# Patient Record
Sex: FEMALE | Race: WHITE
[De-identification: names, ages, dates, MRNs, and addresses within clinical notes are randomized per-mention and may not be internally consistent; named-entity substitution may affect disease eponyms.]

---

## 2017-06-21 ENCOUNTER — TRANSCRIPTION ENCOUNTER (OUTPATIENT)
Age: 52
End: 2017-06-21

## 2017-09-07 ENCOUNTER — APPOINTMENT (OUTPATIENT)
Dept: PULMONOLOGY | Facility: CLINIC | Age: 52
End: 2017-09-07

## 2017-10-17 ENCOUNTER — TRANSCRIPTION ENCOUNTER (OUTPATIENT)
Age: 52
End: 2017-10-17

## 2018-04-06 ENCOUNTER — TRANSCRIPTION ENCOUNTER (OUTPATIENT)
Age: 53
End: 2018-04-06

## 2019-06-15 ENCOUNTER — RECORD ABSTRACTING (OUTPATIENT)
Age: 54
End: 2019-06-15

## 2019-06-15 DIAGNOSIS — Z86.39 PERSONAL HISTORY OF OTHER ENDOCRINE, NUTRITIONAL AND METABOLIC DISEASE: ICD-10-CM

## 2019-06-15 DIAGNOSIS — Z87.42 PERSONAL HISTORY OF OTHER DISEASES OF THE FEMALE GENITAL TRACT: ICD-10-CM

## 2019-06-15 DIAGNOSIS — Z82.0 FAMILY HISTORY OF EPILEPSY AND OTHER DISEASES OF THE NERVOUS SYSTEM: ICD-10-CM

## 2019-06-15 DIAGNOSIS — Z87.09 PERSONAL HISTORY OF OTHER DISEASES OF THE RESPIRATORY SYSTEM: ICD-10-CM

## 2019-06-15 DIAGNOSIS — Z72.0 TOBACCO USE: ICD-10-CM

## 2019-06-15 DIAGNOSIS — Z86.79 PERSONAL HISTORY OF OTHER DISEASES OF THE CIRCULATORY SYSTEM: ICD-10-CM

## 2019-06-15 DIAGNOSIS — F31.70 BIPOLAR DISORDER, CURRENTLY IN REMISSION, MOST RECENT EPISODE UNSPECIFIED: ICD-10-CM

## 2019-07-01 ENCOUNTER — RECORD ABSTRACTING (OUTPATIENT)
Age: 54
End: 2019-07-01

## 2019-07-02 ENCOUNTER — APPOINTMENT (OUTPATIENT)
Dept: FAMILY MEDICINE | Facility: CLINIC | Age: 54
End: 2019-07-02
Payer: MEDICARE

## 2019-07-02 VITALS
BODY MASS INDEX: 47.2 KG/M2 | OXYGEN SATURATION: 96 % | TEMPERATURE: 98 F | WEIGHT: 250 LBS | HEART RATE: 92 BPM | HEIGHT: 61 IN

## 2019-07-02 VITALS — DIASTOLIC BLOOD PRESSURE: 60 MMHG | SYSTOLIC BLOOD PRESSURE: 100 MMHG

## 2019-07-02 PROCEDURE — 36415 COLL VENOUS BLD VENIPUNCTURE: CPT

## 2019-07-02 PROCEDURE — 99214 OFFICE O/P EST MOD 30 MIN: CPT | Mod: 25

## 2019-07-02 NOTE — PHYSICAL EXAM
[Well Nourished] : well nourished [No Acute Distress] : no acute distress [Well-Appearing] : well-appearing [Well Developed] : well developed [Normal Sclera/Conjunctiva] : normal sclera/conjunctiva [PERRL] : pupils equal round and reactive to light [Normal Outer Ear/Nose] : the outer ears and nose were normal in appearance [EOMI] : extraocular movements intact [Normal Oropharynx] : the oropharynx was normal [No Lymphadenopathy] : no lymphadenopathy [No JVD] : no jugular venous distention [Supple] : supple [Thyroid Normal, No Nodules] : the thyroid was normal and there were no nodules present [No Respiratory Distress] : no respiratory distress  [No Accessory Muscle Use] : no accessory muscle use [Clear to Auscultation] : lungs were clear to auscultation bilaterally [Normal Rate] : normal rate  [Regular Rhythm] : with a regular rhythm [Normal S1, S2] : normal S1 and S2 [No Murmur] : no murmur heard [No Carotid Bruits] : no carotid bruits [No Varicosities] : no varicosities [No Abdominal Bruit] : a ~M bruit was not heard ~T in the abdomen [Pedal Pulses Present] : the pedal pulses are present [No Edema] : there was no peripheral edema [No Palpable Aorta] : no palpable aorta [No Extremity Clubbing/Cyanosis] : no extremity clubbing/cyanosis [Soft] : abdomen soft [Non Tender] : non-tender [Non-distended] : non-distended [No Masses] : no abdominal mass palpated [No HSM] : no HSM [Normal Bowel Sounds] : normal bowel sounds [Normal Posterior Cervical Nodes] : no posterior cervical lymphadenopathy [No CVA Tenderness] : no CVA  tenderness [Normal Anterior Cervical Nodes] : no anterior cervical lymphadenopathy [No Spinal Tenderness] : no spinal tenderness [No Joint Swelling] : no joint swelling [No Rash] : no rash [Grossly Normal Strength/Tone] : grossly normal strength/tone [Coordination Grossly Intact] : coordination grossly intact [No Focal Deficits] : no focal deficits [Normal Gait] : normal gait [Normal Affect] : the affect was normal [Deep Tendon Reflexes (DTR)] : deep tendon reflexes were 2+ and symmetric [Normal Insight/Judgement] : insight and judgment were intact

## 2019-07-02 NOTE — HISTORY OF PRESENT ILLNESS
[FreeTextEntry1] : 3 month f/u  [de-identified] : Patient here for her three-month check, needs blood work for renal functions, and diabetes

## 2019-07-02 NOTE — ASSESSMENT
[FreeTextEntry1] : Patient here for renewals, and blood pressure was found to be low, I will adjust medications today

## 2019-07-03 LAB
ALBUMIN SERPL ELPH-MCNC: 4.3 G/DL
ALP BLD-CCNC: 51 U/L
ALT SERPL-CCNC: 13 U/L
ANION GAP SERPL CALC-SCNC: 13 MMOL/L
AST SERPL-CCNC: 11 U/L
BILIRUB DIRECT SERPL-MCNC: 0.1 MG/DL
BILIRUB INDIRECT SERPL-MCNC: 0.1 MG/DL
BILIRUB SERPL-MCNC: 0.2 MG/DL
BUN SERPL-MCNC: 27 MG/DL
CALCIUM SERPL-MCNC: 9.2 MG/DL
CHLORIDE SERPL-SCNC: 105 MMOL/L
CHOLEST SERPL-MCNC: 182 MG/DL
CHOLEST/HDLC SERPL: 4.8 RATIO
CO2 SERPL-SCNC: 24 MMOL/L
CREAT SERPL-MCNC: 1.84 MG/DL
ESTIMATED AVERAGE GLUCOSE: 111 MG/DL
GLUCOSE SERPL-MCNC: 85 MG/DL
HBA1C MFR BLD HPLC: 5.5 %
HDLC SERPL-MCNC: 38 MG/DL
LDLC SERPL CALC-MCNC: 98 MG/DL
POTASSIUM SERPL-SCNC: 4.4 MMOL/L
PROT SERPL-MCNC: 6.7 G/DL
SODIUM SERPL-SCNC: 142 MMOL/L
TRIGL SERPL-MCNC: 230 MG/DL

## 2019-07-09 ENCOUNTER — MEDICATION RENEWAL (OUTPATIENT)
Age: 54
End: 2019-07-09

## 2019-07-09 ENCOUNTER — RX RENEWAL (OUTPATIENT)
Age: 54
End: 2019-07-09

## 2019-08-16 ENCOUNTER — RX RENEWAL (OUTPATIENT)
Age: 54
End: 2019-08-16

## 2019-08-23 ENCOUNTER — RX RENEWAL (OUTPATIENT)
Age: 54
End: 2019-08-23

## 2019-08-23 RX ORDER — BLOOD SUGAR DIAGNOSTIC
STRIP MISCELLANEOUS TWICE DAILY
Qty: 180 | Refills: 3 | Status: ACTIVE | COMMUNITY
Start: 1900-01-01 | End: 1900-01-01

## 2019-10-04 ENCOUNTER — APPOINTMENT (OUTPATIENT)
Dept: FAMILY MEDICINE | Facility: CLINIC | Age: 54
End: 2019-10-04

## 2019-10-11 ENCOUNTER — MED ADMIN CHARGE (OUTPATIENT)
Age: 54
End: 2019-10-11

## 2019-10-11 ENCOUNTER — APPOINTMENT (OUTPATIENT)
Dept: FAMILY MEDICINE | Facility: CLINIC | Age: 54
End: 2019-10-11
Payer: MEDICARE

## 2019-10-11 VITALS
HEIGHT: 72 IN | SYSTOLIC BLOOD PRESSURE: 100 MMHG | WEIGHT: 269.6 LBS | DIASTOLIC BLOOD PRESSURE: 80 MMHG | OXYGEN SATURATION: 9 % | HEART RATE: 87 BPM | BODY MASS INDEX: 36.52 KG/M2

## 2019-10-11 VITALS
SYSTOLIC BLOOD PRESSURE: 100 MMHG | BODY MASS INDEX: 47.58 KG/M2 | WEIGHT: 251.8 LBS | OXYGEN SATURATION: 91 % | DIASTOLIC BLOOD PRESSURE: 60 MMHG | HEART RATE: 97 BPM | TEMPERATURE: 98.4 F

## 2019-10-11 PROCEDURE — 36415 COLL VENOUS BLD VENIPUNCTURE: CPT

## 2019-10-11 PROCEDURE — 99213 OFFICE O/P EST LOW 20 MIN: CPT | Mod: 25

## 2019-10-11 PROCEDURE — 90686 IIV4 VACC NO PRSV 0.5 ML IM: CPT

## 2019-10-11 PROCEDURE — G0008: CPT

## 2019-10-11 PROCEDURE — 81003 URINALYSIS AUTO W/O SCOPE: CPT | Mod: QW

## 2019-10-11 NOTE — HISTORY OF PRESENT ILLNESS
[de-identified] : 54-year-old female past medical history hypertension hyperlipidemia diabetes mellitus type 2 non-insulin-dependent. Followed by renal and urology.  Followed by spinal orthopedic specialist chronic back pain. [FreeTextEntry1] : check up

## 2019-10-11 NOTE — PHYSICAL EXAM
[Normal Sclera/Conjunctiva] : normal sclera/conjunctiva [Normal Outer Ear/Nose] : the outer ears and nose were normal in appearance [No Lymphadenopathy] : no lymphadenopathy [No Focal Deficits] : no focal deficits [No Extremity Clubbing/Cyanosis] : no extremity clubbing/cyanosis [Normal] : affect was normal and insight and judgment were intact

## 2019-10-11 NOTE — HEALTH RISK ASSESSMENT
[Patient reported colonoscopy was abnormal] : Patient reported colonoscopy was abnormal [No] : No [MammogramDate] : 08/19 [PapSmearDate] : 04/19 [ColonoscopyDate] : 10/19 [ColonoscopyComments] : bx done, needs repeat in 3 years [] : No

## 2019-10-11 NOTE — HISTORY OF PRESENT ILLNESS
[de-identified] : 54-year-old female past medical history hypertension hyperlipidemia diabetes mellitus type 2 non-insulin-dependent. Followed by renal and urology.  Followed by spinal orthopedic specialist chronic back pain. [FreeTextEntry1] : check up

## 2019-10-11 NOTE — PHYSICAL EXAM
[Normal Sclera/Conjunctiva] : normal sclera/conjunctiva [Normal Outer Ear/Nose] : the outer ears and nose were normal in appearance [No Lymphadenopathy] : no lymphadenopathy [No Extremity Clubbing/Cyanosis] : no extremity clubbing/cyanosis [No Focal Deficits] : no focal deficits [Normal] : affect was normal and insight and judgment were intact

## 2019-10-11 NOTE — PLAN
[FreeTextEntry1] : Discontinue labetalol secondary to hypotension. Followup with nephrology. And return to office in one month

## 2019-10-17 LAB
ALBUMIN SERPL ELPH-MCNC: 4.5 G/DL
ALP BLD-CCNC: 57 U/L
ALT SERPL-CCNC: 11 U/L
ANION GAP SERPL CALC-SCNC: 19 MMOL/L
AST SERPL-CCNC: 12 U/L
BILIRUB DIRECT SERPL-MCNC: 0.1 MG/DL
BILIRUB INDIRECT SERPL-MCNC: 0.1 MG/DL
BILIRUB SERPL-MCNC: 0.2 MG/DL
BILIRUB UR QL STRIP: NORMAL
BUN SERPL-MCNC: 32 MG/DL
CALCIUM SERPL-MCNC: 9.5 MG/DL
CHLORIDE SERPL-SCNC: 95 MMOL/L
CHOLEST SERPL-MCNC: 178 MG/DL
CHOLEST/HDLC SERPL: 3.8 RATIO
CO2 SERPL-SCNC: 25 MMOL/L
CREAT SERPL-MCNC: 1.72 MG/DL
ESTIMATED AVERAGE GLUCOSE: 143 MG/DL
GLUCOSE SERPL-MCNC: 100 MG/DL
GLUCOSE UR-MCNC: NORMAL
HBA1C MFR BLD HPLC: 6.6 %
HCG UR QL: 0.2 EU/DL
HDLC SERPL-MCNC: 47 MG/DL
HGB UR QL STRIP.AUTO: NORMAL
KETONES UR-MCNC: NORMAL
LDLC SERPL CALC-MCNC: 57 MG/DL
LEUKOCYTE ESTERASE UR QL STRIP: NORMAL
NITRITE UR QL STRIP: NORMAL
PH UR STRIP: 6.5
POTASSIUM SERPL-SCNC: 4.2 MMOL/L
PROT SERPL-MCNC: 6.8 G/DL
PROT UR STRIP-MCNC: NORMAL
SODIUM SERPL-SCNC: 139 MMOL/L
SP GR UR STRIP: 1.01
TRIGL SERPL-MCNC: 372 MG/DL

## 2019-11-04 ENCOUNTER — RX RENEWAL (OUTPATIENT)
Age: 54
End: 2019-11-04

## 2019-11-07 ENCOUNTER — MED ADMIN CHARGE (OUTPATIENT)
Age: 54
End: 2019-11-07

## 2019-11-07 ENCOUNTER — APPOINTMENT (OUTPATIENT)
Dept: FAMILY MEDICINE | Facility: CLINIC | Age: 54
End: 2019-11-07
Payer: MEDICARE

## 2019-11-07 VITALS
HEART RATE: 127 BPM | BODY MASS INDEX: 44.03 KG/M2 | SYSTOLIC BLOOD PRESSURE: 124 MMHG | WEIGHT: 248.5 LBS | HEIGHT: 63 IN | OXYGEN SATURATION: 97 % | DIASTOLIC BLOOD PRESSURE: 80 MMHG | TEMPERATURE: 98.2 F

## 2019-11-07 PROCEDURE — G0009: CPT

## 2019-11-07 PROCEDURE — 90732 PPSV23 VACC 2 YRS+ SUBQ/IM: CPT

## 2019-11-07 PROCEDURE — 99213 OFFICE O/P EST LOW 20 MIN: CPT | Mod: 25

## 2019-11-07 NOTE — HISTORY OF PRESENT ILLNESS
[FreeTextEntry1] : pt here to check bp  [de-identified] : 54 year old female here for pneumococcal vaccine. recent discontinuation of BP med. Patient is doing well and BP is controlled.

## 2019-11-07 NOTE — PHYSICAL EXAM
[Normal] : affect was normal and insight and judgment were intact [No Focal Deficits] : no focal deficits

## 2020-01-07 ENCOUNTER — TRANSCRIPTION ENCOUNTER (OUTPATIENT)
Age: 55
End: 2020-01-07

## 2020-01-17 ENCOUNTER — APPOINTMENT (OUTPATIENT)
Dept: FAMILY MEDICINE | Facility: CLINIC | Age: 55
End: 2020-01-17
Payer: MEDICARE

## 2020-01-17 VITALS
BODY MASS INDEX: 43.27 KG/M2 | SYSTOLIC BLOOD PRESSURE: 146 MMHG | OXYGEN SATURATION: 97 % | HEART RATE: 112 BPM | TEMPERATURE: 98.2 F | DIASTOLIC BLOOD PRESSURE: 86 MMHG | WEIGHT: 244.25 LBS

## 2020-01-17 PROCEDURE — 99213 OFFICE O/P EST LOW 20 MIN: CPT

## 2020-01-17 NOTE — PHYSICAL EXAM
[No Lymphadenopathy] : no lymphadenopathy [No Focal Deficits] : no focal deficits [Normal] : affect was normal and insight and judgment were intact

## 2020-01-17 NOTE — PLAN
[FreeTextEntry1] : Patient has BREO at home as well as spiriva, Ventolin, singulair, which she uses chronically and given by her pulmonologist\par Continue Allegra\par Prednisone is being prescribed, educated patient in reference to her sugars being affected by the prednisone. Patient understands, and we'll watch diet more closely.\par Sugar-free Tussen will be taken\par

## 2020-01-17 NOTE — REVIEW OF SYSTEMS
[Hoarseness] : hoarseness [Nasal Discharge] : nasal discharge [Postnasal Drip] : postnasal drip [Cough] : cough [Negative] : Heme/Lymph [Sore Throat] : no sore throat [Shortness Of Breath] : no shortness of breath [Wheezing] : no wheezing

## 2020-01-17 NOTE — HISTORY OF PRESENT ILLNESS
[FreeTextEntry1] : Pt is f/u from urgent care from 01-. Pt is also here to have bp checked.  [de-identified] : 54-year-old female here complaining of congestion, coughing, no sore throat. Went to urgent care January 6, 2020.  Chest x-ray was done, within normal limits. Was told to take Coricidin and discharged.\par Patient not getting better, her diabetes is well controlled.\par

## 2020-01-17 NOTE — ASSESSMENT
[FreeTextEntry1] : Past medical history of asthma, obstructive lung disease being followed by pulmonary,

## 2020-01-30 ENCOUNTER — APPOINTMENT (OUTPATIENT)
Dept: FAMILY MEDICINE | Facility: CLINIC | Age: 55
End: 2020-01-30
Payer: MEDICARE

## 2020-01-30 VITALS
OXYGEN SATURATION: 97 % | BODY MASS INDEX: 43.22 KG/M2 | SYSTOLIC BLOOD PRESSURE: 148 MMHG | DIASTOLIC BLOOD PRESSURE: 84 MMHG | TEMPERATURE: 98.2 F | HEART RATE: 110 BPM | WEIGHT: 244 LBS

## 2020-01-30 DIAGNOSIS — Z87.09 PERSONAL HISTORY OF OTHER DISEASES OF THE RESPIRATORY SYSTEM: ICD-10-CM

## 2020-01-30 DIAGNOSIS — J98.01 ACUTE BRONCHOSPASM: ICD-10-CM

## 2020-01-30 PROCEDURE — 36415 COLL VENOUS BLD VENIPUNCTURE: CPT

## 2020-01-30 PROCEDURE — 99213 OFFICE O/P EST LOW 20 MIN: CPT | Mod: 25

## 2020-01-30 RX ORDER — AZITHROMYCIN 250 MG/1
250 TABLET, FILM COATED ORAL
Qty: 1 | Refills: 0 | Status: DISCONTINUED | COMMUNITY
Start: 2020-01-17 | End: 2020-01-30

## 2020-01-30 RX ORDER — LABETALOL HYDROCHLORIDE 100 MG/1
100 TABLET, FILM COATED ORAL
Qty: 60 | Refills: 2 | Status: DISCONTINUED | COMMUNITY
End: 2020-01-30

## 2020-01-30 RX ORDER — PREDNISONE 10 MG/1
10 TABLET ORAL
Qty: 15 | Refills: 0 | Status: DISCONTINUED | COMMUNITY
Start: 2020-01-17 | End: 2020-01-30

## 2020-01-30 NOTE — PHYSICAL EXAM
[Normal Outer Ear/Nose] : the outer ears and nose were normal in appearance [Normal Sclera/Conjunctiva] : normal sclera/conjunctiva [No Lymphadenopathy] : no lymphadenopathy [No Extremity Clubbing/Cyanosis] : no extremity clubbing/cyanosis [No Focal Deficits] : no focal deficits [Normal] : affect was normal and insight and judgment were intact

## 2020-01-30 NOTE — HISTORY OF PRESENT ILLNESS
[FreeTextEntry1] : pt here to f/u for check up and labs.  [de-identified] : 54-year-old female here today for blood work, 3 month checkup. Feeling much better since last visit. Taking her inhalers daily. Today he needs blood work taken for her spine specialist, secondary to history of severe back pain secondary to degenerative disc disease

## 2020-02-04 LAB
ALBUMIN SERPL ELPH-MCNC: 4.2 G/DL
ALP BLD-CCNC: 59 U/L
ALT SERPL-CCNC: 13 U/L
ANION GAP SERPL CALC-SCNC: 13 MMOL/L
AST SERPL-CCNC: 12 U/L
BASOPHILS # BLD AUTO: 0.08 K/UL
BASOPHILS NFR BLD AUTO: 0.7 %
BILIRUB SERPL-MCNC: 0.2 MG/DL
BUN SERPL-MCNC: 35 MG/DL
CALCIUM SERPL-MCNC: 8.8 MG/DL
CHLORIDE SERPL-SCNC: 104 MMOL/L
CHOLEST SERPL-MCNC: 186 MG/DL
CHOLEST/HDLC SERPL: 4.4 RATIO
CO2 SERPL-SCNC: 23 MMOL/L
CREAT SERPL-MCNC: 1.62 MG/DL
EOSINOPHIL # BLD AUTO: 0.49 K/UL
EOSINOPHIL NFR BLD AUTO: 4.2 %
ESTIMATED AVERAGE GLUCOSE: 140 MG/DL
GLUCOSE SERPL-MCNC: 147 MG/DL
HBA1C MFR BLD HPLC: 6.5 %
HCT VFR BLD CALC: 38.1 %
HDLC SERPL-MCNC: 42 MG/DL
HGB BLD-MCNC: 11.9 G/DL
IMM GRANULOCYTES NFR BLD AUTO: 0.3 %
LDLC SERPL CALC-MCNC: 96 MG/DL
LYMPHOCYTES # BLD AUTO: 3.16 K/UL
LYMPHOCYTES NFR BLD AUTO: 27.4 %
MAN DIFF?: NORMAL
MCHC RBC-ENTMCNC: 30.4 PG
MCHC RBC-ENTMCNC: 31.2 GM/DL
MCV RBC AUTO: 97.2 FL
MONOCYTES # BLD AUTO: 0.99 K/UL
MONOCYTES NFR BLD AUTO: 8.6 %
NEUTROPHILS # BLD AUTO: 6.79 K/UL
NEUTROPHILS NFR BLD AUTO: 58.8 %
PLATELET # BLD AUTO: 223 K/UL
POTASSIUM SERPL-SCNC: 4.3 MMOL/L
PROT SERPL-MCNC: 6.5 G/DL
RBC # BLD: 3.92 M/UL
RBC # FLD: 14.2 %
SODIUM SERPL-SCNC: 139 MMOL/L
TRIGL SERPL-MCNC: 237 MG/DL
WBC # FLD AUTO: 11.55 K/UL

## 2020-03-30 ENCOUNTER — RX RENEWAL (OUTPATIENT)
Age: 55
End: 2020-03-30

## 2020-04-13 ENCOUNTER — APPOINTMENT (OUTPATIENT)
Dept: FAMILY MEDICINE | Facility: CLINIC | Age: 55
End: 2020-04-13
Payer: MEDICARE

## 2020-04-13 PROCEDURE — 99442: CPT

## 2020-04-17 ENCOUNTER — APPOINTMENT (OUTPATIENT)
Dept: FAMILY MEDICINE | Facility: CLINIC | Age: 55
End: 2020-04-17
Payer: MEDICARE

## 2020-04-17 DIAGNOSIS — H00.15 CHALAZION LEFT LOWER EYELID: ICD-10-CM

## 2020-04-17 PROCEDURE — 99442: CPT

## 2020-04-22 ENCOUNTER — APPOINTMENT (OUTPATIENT)
Dept: FAMILY MEDICINE | Facility: CLINIC | Age: 55
End: 2020-04-22
Payer: MEDICARE

## 2020-04-22 PROCEDURE — 99442: CPT | Mod: CS

## 2020-04-28 ENCOUNTER — TRANSCRIPTION ENCOUNTER (OUTPATIENT)
Age: 55
End: 2020-04-28

## 2020-05-05 ENCOUNTER — RX RENEWAL (OUTPATIENT)
Age: 55
End: 2020-05-05

## 2020-06-23 ENCOUNTER — RX RENEWAL (OUTPATIENT)
Age: 55
End: 2020-06-23

## 2020-09-22 ENCOUNTER — APPOINTMENT (OUTPATIENT)
Dept: FAMILY MEDICINE | Facility: CLINIC | Age: 55
End: 2020-09-22
Payer: MEDICARE

## 2020-09-22 VITALS
DIASTOLIC BLOOD PRESSURE: 90 MMHG | SYSTOLIC BLOOD PRESSURE: 143 MMHG | OXYGEN SATURATION: 99 % | RESPIRATION RATE: 14 BRPM | WEIGHT: 237.2 LBS | TEMPERATURE: 97.1 F | HEART RATE: 94 BPM | BODY MASS INDEX: 42.02 KG/M2

## 2020-09-22 DIAGNOSIS — L03.90 CELLULITIS, UNSPECIFIED: ICD-10-CM

## 2020-09-22 PROCEDURE — 90686 IIV4 VACC NO PRSV 0.5 ML IM: CPT

## 2020-09-22 PROCEDURE — 99213 OFFICE O/P EST LOW 20 MIN: CPT | Mod: 25

## 2020-09-22 PROCEDURE — 36415 COLL VENOUS BLD VENIPUNCTURE: CPT

## 2020-09-22 PROCEDURE — G0008: CPT

## 2020-09-22 NOTE — HISTORY OF PRESENT ILLNESS
[FreeTextEntry1] : Pt is here for regular check up and medication renewal. [de-identified] : [age]-year-old female patient past medical history of hypertension, hyperlipidemia, obstructive lung disease. Diabetes mellitus type 2 non-insulin-dependent, watching her diet

## 2020-09-26 LAB
ANION GAP SERPL CALC-SCNC: 13 MMOL/L
BASOPHILS # BLD AUTO: 0.1 K/UL
BASOPHILS NFR BLD AUTO: 1 %
BUN SERPL-MCNC: 16 MG/DL
CALCIUM SERPL-MCNC: 8.7 MG/DL
CHLORIDE SERPL-SCNC: 101 MMOL/L
CO2 SERPL-SCNC: 24 MMOL/L
CREAT SERPL-MCNC: 1.65 MG/DL
EOSINOPHIL # BLD AUTO: 0.31 K/UL
EOSINOPHIL NFR BLD AUTO: 3.1 %
ESTIMATED AVERAGE GLUCOSE: 146 MG/DL
GLUCOSE SERPL-MCNC: 124 MG/DL
HBA1C MFR BLD HPLC: 6.7 %
HCT VFR BLD CALC: 40.7 %
HGB BLD-MCNC: 12.4 G/DL
IMM GRANULOCYTES NFR BLD AUTO: 0.2 %
LYMPHOCYTES # BLD AUTO: 2.88 K/UL
LYMPHOCYTES NFR BLD AUTO: 28.5 %
MAN DIFF?: NORMAL
MCHC RBC-ENTMCNC: 30 PG
MCHC RBC-ENTMCNC: 30.5 GM/DL
MCV RBC AUTO: 98.5 FL
MONOCYTES # BLD AUTO: 0.89 K/UL
MONOCYTES NFR BLD AUTO: 8.8 %
NEUTROPHILS # BLD AUTO: 5.92 K/UL
NEUTROPHILS NFR BLD AUTO: 58.4 %
PLATELET # BLD AUTO: 251 K/UL
POTASSIUM SERPL-SCNC: 4.6 MMOL/L
RBC # BLD: 4.13 M/UL
RBC # FLD: 14.1 %
SODIUM SERPL-SCNC: 139 MMOL/L
WBC # FLD AUTO: 10.12 K/UL

## 2020-10-07 ENCOUNTER — RX RENEWAL (OUTPATIENT)
Age: 55
End: 2020-10-07

## 2020-11-17 ENCOUNTER — TRANSCRIPTION ENCOUNTER (OUTPATIENT)
Age: 55
End: 2020-11-17

## 2021-02-27 ENCOUNTER — APPOINTMENT (OUTPATIENT)
Dept: FAMILY MEDICINE | Facility: CLINIC | Age: 56
End: 2021-02-27
Payer: MEDICARE

## 2021-02-27 ENCOUNTER — NON-APPOINTMENT (OUTPATIENT)
Age: 56
End: 2021-02-27

## 2021-02-27 VITALS
HEART RATE: 114 BPM | SYSTOLIC BLOOD PRESSURE: 100 MMHG | TEMPERATURE: 97.5 F | BODY MASS INDEX: 39.86 KG/M2 | WEIGHT: 225 LBS | OXYGEN SATURATION: 94 % | RESPIRATION RATE: 14 BRPM | DIASTOLIC BLOOD PRESSURE: 60 MMHG

## 2021-02-27 VITALS — DIASTOLIC BLOOD PRESSURE: 90 MMHG | SYSTOLIC BLOOD PRESSURE: 146 MMHG

## 2021-02-27 VITALS — SYSTOLIC BLOOD PRESSURE: 159 MMHG | DIASTOLIC BLOOD PRESSURE: 96 MMHG

## 2021-02-27 PROCEDURE — 99212 OFFICE O/P EST SF 10 MIN: CPT

## 2021-02-27 NOTE — HISTORY OF PRESENT ILLNESS
[FreeTextEntry1] : Pt is here for 6 months follow up. [de-identified] : 55-year-old female patient here for her followup visit55-year-old female patient past medical history of diabetes mellitus type 2, rhinitis, asthma, COPD, hyperlipidemia, insomnia, bipolar\par patient is currently not on any antihypertensive medication. Her blood pressure was found to be elevated today. Patient is obese although she did not gain any more weight she is stable

## 2021-02-27 NOTE — PLAN
[FreeTextEntry1] : Low-salt diet restart blood pressure medication mild tachycardia, metoprolol tartrate 50 mg b.i.d. prescribed

## 2021-02-27 NOTE — PHYSICAL EXAM
[Normal Sclera/Conjunctiva] : normal sclera/conjunctiva [Normal Outer Ear/Nose] : the outer ears and nose were normal in appearance [Normal] : normal rate, regular rhythm, normal S1 and S2 and no murmur heard [No Focal Deficits] : no focal deficits [No Rash] : no rash

## 2021-03-05 LAB
ALBUMIN SERPL ELPH-MCNC: 3.9 G/DL
ALP BLD-CCNC: 75 U/L
ALT SERPL-CCNC: 16 U/L
ANION GAP SERPL CALC-SCNC: 16 MMOL/L
AST SERPL-CCNC: 13 U/L
BILIRUB DIRECT SERPL-MCNC: 0.1 MG/DL
BILIRUB INDIRECT SERPL-MCNC: 0.1 MG/DL
BILIRUB SERPL-MCNC: 0.2 MG/DL
BUN SERPL-MCNC: 21 MG/DL
CALCIUM SERPL-MCNC: 9.9 MG/DL
CHLORIDE SERPL-SCNC: 98 MMOL/L
CHOLEST SERPL-MCNC: 198 MG/DL
CO2 SERPL-SCNC: 22 MMOL/L
CREAT SERPL-MCNC: 1.65 MG/DL
ESTIMATED AVERAGE GLUCOSE: 137 MG/DL
GLUCOSE SERPL-MCNC: 123 MG/DL
HBA1C MFR BLD HPLC: 6.4 %
HDLC SERPL-MCNC: 37 MG/DL
LDLC SERPL CALC-MCNC: 106 MG/DL
NONHDLC SERPL-MCNC: 162 MG/DL
POTASSIUM SERPL-SCNC: 5 MMOL/L
PROT SERPL-MCNC: 7.2 G/DL
SODIUM SERPL-SCNC: 136 MMOL/L
TRIGL SERPL-MCNC: 279 MG/DL

## 2021-03-13 ENCOUNTER — APPOINTMENT (OUTPATIENT)
Dept: FAMILY MEDICINE | Facility: CLINIC | Age: 56
End: 2021-03-13
Payer: MEDICARE

## 2021-03-13 VITALS
SYSTOLIC BLOOD PRESSURE: 132 MMHG | DIASTOLIC BLOOD PRESSURE: 84 MMHG | TEMPERATURE: 98 F | RESPIRATION RATE: 14 BRPM | HEART RATE: 111 BPM | OXYGEN SATURATION: 91 %

## 2021-03-13 DIAGNOSIS — N23 UNSPECIFIED RENAL COLIC: ICD-10-CM

## 2021-03-13 PROCEDURE — 99212 OFFICE O/P EST SF 10 MIN: CPT

## 2021-03-13 NOTE — HISTORY OF PRESENT ILLNESS
[FreeTextEntry1] : 2 week follow up [de-identified] : D5-year-old patient here today for a check a past medical history of diabetes mellitus type 2, COPD, hyperlipidemia. Recently was at gynecology, given a prescription for a mammogram and a sonogram, she will call and make an appointment. Also her Pap smear was positive, GYN would like to do colposcopy., patient would like to repeat Pap smear beforetaking the next step\par Past history of renal insufficiency, being seen and followed by nephrology. She is urinating well\par .Patient has also been followed by urology, 24 hour urine will be performed by them. A sonogram will be taken, and she will follow up with them

## 2021-03-13 NOTE — ASSESSMENT
[FreeTextEntry1] : 55-year-old female patient here for followup, we reviewed her blood work today. Her kidney function is stable, being followed by nephrology, urology.\par Diabetes mellitus type 2 in control\par Hypertriglyceridemiadistant with her diabetes\par

## 2021-03-13 NOTE — PLAN
[FreeTextEntry1] : The patient is in the process of making an appointment for her vaccination, covid.  Patient was reassured to continue precautionary measures to her injection is given

## 2021-04-27 ENCOUNTER — APPOINTMENT (OUTPATIENT)
Dept: FAMILY MEDICINE | Facility: CLINIC | Age: 56
End: 2021-04-27
Payer: MEDICARE

## 2021-04-27 ENCOUNTER — RX RENEWAL (OUTPATIENT)
Age: 56
End: 2021-04-27

## 2021-04-27 VITALS — OXYGEN SATURATION: 93 % | HEART RATE: 82 BPM | TEMPERATURE: 97.6 F

## 2021-04-27 VITALS
DIASTOLIC BLOOD PRESSURE: 60 MMHG | BODY MASS INDEX: 42.17 KG/M2 | RESPIRATION RATE: 83 BRPM | WEIGHT: 238 LBS | HEIGHT: 63 IN | SYSTOLIC BLOOD PRESSURE: 120 MMHG | TEMPERATURE: 97.6 F | OXYGEN SATURATION: 95 %

## 2021-04-27 DIAGNOSIS — M19.90 UNSPECIFIED OSTEOARTHRITIS, UNSPECIFIED SITE: ICD-10-CM

## 2021-04-27 DIAGNOSIS — M79.606 PAIN IN LEG, UNSPECIFIED: ICD-10-CM

## 2021-04-27 PROCEDURE — 99214 OFFICE O/P EST MOD 30 MIN: CPT | Mod: 25

## 2021-04-27 PROCEDURE — 36415 COLL VENOUS BLD VENIPUNCTURE: CPT

## 2021-04-27 RX ORDER — FEXOFENADINE HYDROCHLORIDE 180 MG/1
180 TABLET ORAL
Qty: 90 | Refills: 0 | Status: DISCONTINUED | COMMUNITY
End: 2021-04-27

## 2021-04-27 RX ORDER — METOPROLOL TARTRATE 50 MG/1
50 TABLET, FILM COATED ORAL
Qty: 180 | Refills: 0 | Status: DISCONTINUED | COMMUNITY
Start: 2021-02-27 | End: 2021-04-27

## 2021-04-27 RX ORDER — CEFADROXIL 500 MG/1
500 CAPSULE ORAL
Qty: 10 | Refills: 0 | Status: DISCONTINUED | COMMUNITY
Start: 2020-09-22 | End: 2021-04-27

## 2021-04-27 RX ORDER — TOBRAMYCIN 3 MG/ML
0.3 SOLUTION/ DROPS OPHTHALMIC EVERY 6 HOURS
Qty: 1 | Refills: 0 | Status: DISCONTINUED | COMMUNITY
Start: 2020-04-17 | End: 2021-04-27

## 2021-04-27 RX ORDER — NABUMETONE 500 MG/1
500 TABLET, FILM COATED ORAL
Refills: 0 | Status: DISCONTINUED | COMMUNITY
End: 2021-04-27

## 2021-04-27 NOTE — PLAN
[FreeTextEntry1] : Followup spine specialist, as well as neurology for her diabetic neuropathy bilateral lower extremities

## 2021-04-27 NOTE — HISTORY OF PRESENT ILLNESS
[FreeTextEntry1] : 3 month check up [de-identified] : 55-year-old female patient past medical history of diabetes mellitus type 2, hyperlipidemia, is being followed by a spine specialist splitorthopedist.Process of getting an MRI of her lumbar spine.Patient states the pain is in her last shin.  Doppler study was done and negative. EMG was also done and Positive for neuropathy which has increased.Here today for her three-month followup. Watching her diet, with complaints of chronic back pain.\par Patient was vaccinated moderna March 31, 2021. Vaccination #2 Will be on April 28, 2021\par ophthalmology will be seen she has an appointment fo May 7, 2021

## 2021-04-27 NOTE — PHYSICAL EXAM
[Normal Sclera/Conjunctiva] : normal sclera/conjunctiva [Normal Outer Ear/Nose] : the outer ears and nose were normal in appearance [Normal] : normal rate, regular rhythm, normal S1 and S2 and no murmur heard [No Rash] : no rash [de-identified] : Left shin pain to palpation, no erythema, no soft tissue swelling.Spinehas decreased range of motion secondary to tenderness [de-identified] : .................................................................................................................................................

## 2021-04-27 NOTE — PHYSICAL EXAM
[Normal Sclera/Conjunctiva] : normal sclera/conjunctiva [Normal Outer Ear/Nose] : the outer ears and nose were normal in appearance [Normal] : normal rate, regular rhythm, normal S1 and S2 and no murmur heard [No Rash] : no rash [de-identified] : Left shin pain to palpation, no erythema, no soft tissue swelling.Spinehas decreased range of motion secondary to tenderness [de-identified] : .................................................................................................................................................

## 2021-04-27 NOTE — HISTORY OF PRESENT ILLNESS
[FreeTextEntry1] : 3 month check up [de-identified] : 55-year-old female patient past medical history of diabetes mellitus type 2, hyperlipidemia, is being followed by a spine specialist splitorthopedist.Process of getting an MRI of her lumbar spine.Patient states the pain is in her last shin.  Doppler study was done and negative. EMG was also done and Positive for neuropathy which has increased.Here today for her three-month followup. Watching her diet, with complaints of chronic back pain.\par Patient was vaccinated moderna March 31, 2021. Vaccination #2 Will be on April 28, 2021\par ophthalmology will be seen she has an appointment fo May 7, 2021

## 2021-04-28 LAB
ALBUMIN SERPL ELPH-MCNC: 4.3 G/DL
ALP BLD-CCNC: 69 U/L
ALT SERPL-CCNC: 10 U/L
ANION GAP SERPL CALC-SCNC: 16 MMOL/L
AST SERPL-CCNC: 9 U/L
BASOPHILS # BLD AUTO: 0.17 K/UL
BASOPHILS NFR BLD AUTO: 1.4 %
BILIRUB DIRECT SERPL-MCNC: 0 MG/DL
BILIRUB INDIRECT SERPL-MCNC: NORMAL MG/DL
BILIRUB SERPL-MCNC: <0.2 MG/DL
BUN SERPL-MCNC: 23 MG/DL
CALCIUM SERPL-MCNC: 9 MG/DL
CHLORIDE SERPL-SCNC: 97 MMOL/L
CHOLEST SERPL-MCNC: 207 MG/DL
CO2 SERPL-SCNC: 22 MMOL/L
CREAT SERPL-MCNC: 1.6 MG/DL
EOSINOPHIL # BLD AUTO: 0.83 K/UL
EOSINOPHIL NFR BLD AUTO: 7 %
ESTIMATED AVERAGE GLUCOSE: 131 MG/DL
GLUCOSE SERPL-MCNC: 137 MG/DL
HBA1C MFR BLD HPLC: 6.2 %
HCT VFR BLD CALC: 39.1 %
HDLC SERPL-MCNC: 47 MG/DL
HGB BLD-MCNC: 12.2 G/DL
IMM GRANULOCYTES NFR BLD AUTO: 0.3 %
LDLC SERPL CALC-MCNC: 84 MG/DL
LYMPHOCYTES # BLD AUTO: 4.53 K/UL
LYMPHOCYTES NFR BLD AUTO: 38.4 %
MAN DIFF?: NORMAL
MCHC RBC-ENTMCNC: 29.6 PG
MCHC RBC-ENTMCNC: 31.2 GM/DL
MCV RBC AUTO: 94.9 FL
MONOCYTES # BLD AUTO: 1.07 K/UL
MONOCYTES NFR BLD AUTO: 9.1 %
NEUTROPHILS # BLD AUTO: 5.15 K/UL
NEUTROPHILS NFR BLD AUTO: 43.8 %
NONHDLC SERPL-MCNC: 160 MG/DL
PLATELET # BLD AUTO: 284 K/UL
POTASSIUM SERPL-SCNC: 4.9 MMOL/L
PROT SERPL-MCNC: 7 G/DL
RBC # BLD: 4.12 M/UL
RBC # FLD: 14 %
SODIUM SERPL-SCNC: 135 MMOL/L
TRIGL SERPL-MCNC: 381 MG/DL
WBC # FLD AUTO: 11.79 K/UL

## 2021-05-12 ENCOUNTER — RX RENEWAL (OUTPATIENT)
Age: 56
End: 2021-05-12

## 2021-06-14 ENCOUNTER — APPOINTMENT (OUTPATIENT)
Dept: FAMILY MEDICINE | Facility: CLINIC | Age: 56
End: 2021-06-14
Payer: MEDICARE

## 2021-06-14 VITALS — OXYGEN SATURATION: 98 % | HEART RATE: 94 BPM | RESPIRATION RATE: 12 BRPM | TEMPERATURE: 97.6 F

## 2021-06-14 VITALS — DIASTOLIC BLOOD PRESSURE: 78 MMHG | SYSTOLIC BLOOD PRESSURE: 110 MMHG

## 2021-06-14 DIAGNOSIS — Z87.891 PERSONAL HISTORY OF NICOTINE DEPENDENCE: ICD-10-CM

## 2021-06-14 DIAGNOSIS — Z78.9 OTHER SPECIFIED HEALTH STATUS: ICD-10-CM

## 2021-06-14 DIAGNOSIS — Z82.49 FAMILY HISTORY OF ISCHEMIC HEART DISEASE AND OTHER DISEASES OF THE CIRCULATORY SYSTEM: ICD-10-CM

## 2021-06-14 DIAGNOSIS — Z83.438 FAMILY HISTORY OF OTHER DISORDER OF LIPOPROTEIN METABOLISM AND OTHER LIPIDEMIA: ICD-10-CM

## 2021-06-14 DIAGNOSIS — G62.9 POLYNEUROPATHY, UNSPECIFIED: ICD-10-CM

## 2021-06-14 DIAGNOSIS — Z83.3 FAMILY HISTORY OF DIABETES MELLITUS: ICD-10-CM

## 2021-06-14 DIAGNOSIS — Z86.79 PERSONAL HISTORY OF OTHER DISEASES OF THE CIRCULATORY SYSTEM: ICD-10-CM

## 2021-06-14 PROCEDURE — 99214 OFFICE O/P EST MOD 30 MIN: CPT | Mod: 25

## 2021-06-14 PROCEDURE — 36415 COLL VENOUS BLD VENIPUNCTURE: CPT

## 2021-06-14 RX ORDER — METOPROLOL SUCCINATE 25 MG/1
25 TABLET, EXTENDED RELEASE ORAL
Refills: 0 | Status: DISCONTINUED | COMMUNITY
End: 2021-06-14

## 2021-06-14 RX ORDER — GABAPENTIN 800 MG/1
800 TABLET, FILM COATED ORAL 3 TIMES DAILY
Refills: 0 | Status: ACTIVE | COMMUNITY

## 2021-06-14 NOTE — HISTORY OF PRESENT ILLNESS
[FreeTextEntry1] : here for acute on chronic back pain and f/u chronic concerns [de-identified] : This is a 57y/o female pmhx htn/ hld/ DMT2 w neuropathy BLE, CKD/ COPD/ bipolar disorder/ chronic back pain, here today for c/o acute on chronic back pain. patient reports was receiving Percocet renewals through spine specialist however recent renewal was refused r/t no Percocet noted on drug tox despite claim of taking medication as prescribed. patient looking for continued management for pain. \par reports having been to pain specialist last week however was unsatisfied with visit and refuses f/u, unable to give NP information on provider seen. \par o/w in no acute distress and feeling well.\par will evaluate and manage as appropriate.

## 2021-06-14 NOTE — HEALTH RISK ASSESSMENT
[No] : In the past 12 months have you used drugs other than those required for medical reasons? No [No falls in past year] : Patient reported no falls in the past year [0] : 2) Feeling down, depressed, or hopeless: Not at all (0) [Patient/Caregiver not ready to engage] : Patient/Caregiver not ready to engage [] : No [de-identified] : limited [de-identified] : well balanced [VLA2Dgaxz] : 0 [AdvancecareDate] : 06/21

## 2021-06-14 NOTE — PHYSICAL EXAM
[No Acute Distress] : no acute distress [Well Nourished] : well nourished [Well Developed] : well developed [No Lymphadenopathy] : no lymphadenopathy [No Respiratory Distress] : no respiratory distress  [No Accessory Muscle Use] : no accessory muscle use [Clear to Auscultation] : lungs were clear to auscultation bilaterally [Normal] : normal rate, regular rhythm, normal S1 and S2 and no murmur heard [No Carotid Bruits] : no carotid bruits [No Edema] : there was no peripheral edema [Soft] : abdomen soft [Non Tender] : non-tender [Non-distended] : non-distended [Normal Posterior Cervical Nodes] : no posterior cervical lymphadenopathy [Normal Anterior Cervical Nodes] : no anterior cervical lymphadenopathy [No CVA Tenderness] : no CVA  tenderness [No Rash] : no rash [Coordination Grossly Intact] : coordination grossly intact [Normal Gait] : normal gait [Normal Affect] : the affect was normal [Normal Insight/Judgement] : insight and judgment were intact [de-identified] : B/l thoracic back pain, radiates BLE, no weakness/ stable gait, sitting comfortably during visit  [de-identified] : neuropathy at baseline

## 2021-06-14 NOTE — COUNSELING
[Fall prevention counseling provided] : Fall prevention counseling provided [Behavioral health counseling provided] : Behavioral health counseling provided [Sleep ___ hours/day] : Sleep [unfilled] hours/day [Engage in a relaxing activity] : Engage in a relaxing activity [Plan in advance] : Plan in advance [Potential consequences of obesity discussed] : Potential consequences of obesity discussed [Benefits of weight loss discussed] : Benefits of weight loss discussed [None] : None [Good understanding] : Patient has a good understanding of lifestyle changes and steps needed to achieve self management goal [de-identified] : Maintain balanced diet of whole grain, fruits and vegetables, lean meats, skinless poultry, fish, beans, soy products, eggs, nuts, and low fat dairy as per FDA dietary guidelines. \par Avoid high calorie/low nutrient foods. \par vegetables/fruits- at least 5 servings/day, \par whole grains- 100% whole grain and at least 3 grams fiber/day.\par reduce/eliminate saturated fat- less than 5% on nutrition labels (ex. mack, deli meat, hot dogs, fried foods, cookies, ice cream, chips, soft drinks, etc.)\par stay within your FDA recommended daily calorie needs or use the plate method to portion intake. \par Avoid fast food and limit eating out. When eating out choose healthy alternatives (ex. grilled, baked, steamed. low fat dressings. avoid french fries).\par DO NOT CONSUME FOODS YOU ARE ALLERGIC TO DESPITE DIETARY RECOMMENDATIONS.\par \par For more information you can visit www.Health.gov \par \par Foot care: check feet daily and wash with mild soap and lukewarm water. \par Use lotions (as directed by podiatrist).\par File/clip toe nails after washing (as directed by podiatrist).\par Do not tear calluses. \par Use clean socks with well-fitted shoes.\par Do not go bare foot and do not cross legs. \par you should follow-up with a podiatrist yearly for maintenance of your feet.\par \par Eye care- you should follow-up with an ophthalmologist/optometrist yearly to screen for retinal damage and other complications that may occur secondary to diabetes. \par Bring the paperwork provided to you today to your visit to have the doctor complete and have the paperwork returned to our office.\par \par For more information you can visit www.medlineplus.gov  \par  \par \par back pain education-\par it is important to remain active, listen to the body and do activity as tolerated. application of ice or heat and warm showers/baths with stretching can help with pain relief. maintain healthy posture at work and be mindful to not sit too long- take regular breaks to walk periodically and stretch regularly. use proper form when lifting heavy objects,  take medications for spasms and pain relief as prescribed. there are non-medicinal approaches to help pain relief as well such as massage or acupuncture, however, there is limited evidence to support the use of these alternative methods and you should discuss appropriateness with a medical professional before use. please seek emergency medical care and then notify our office in the event of new onset worsening pain, unrelieved with previous methods of pain relief, new onset fever, weakness in one or both legs, loss of bowel or bladder function or symptoms of sexual dysfunction.

## 2021-06-14 NOTE — REVIEW OF SYSTEMS
[Joint Pain] : joint pain [Back Pain] : back pain [Negative] : Heme/Lymph [Skin Rash] : no skin rash [FreeTextEntry9] : chronic with flare [de-identified] : neuropathy  [de-identified] : not sleeping well a/w pain

## 2021-06-14 NOTE — ASSESSMENT
[FreeTextEntry1] : chronic back pain/ neuropathy BLE\par on Gabapentin, cyclobenzaprine, meloxicam, Tylenol\par counseled on discontinuing Meloxicam a/w renal adverse reactions, patient verbalized understanding and refuses, NP deferred renewals of these medications for now\par spine specialist Dr. Jesse Shaikh- obtaining reports \par referral pain specialist\par \par htn/ hld\par BP well managed today\par on Metoprolol & Simvastatin\par cardiologist Dr. Mahajan; completed stress; negative for ischemia, normal myocardial perfusion, LVEF 58%\par Cor Dopp mild 1-15% stenosis R. bulb and proximal ROSSY, L. bulb and proximal LICA. \par next visit scheduled for Nov. 2021\par offers no concerns\par check lipid\par \par DMT2 w CKD/ renal stones w/o colic R kidney\par on Glipizide & Tradjenta, potassium Citrate 15mgXR for stone prevention\par a1c 6.2% April 2021\par sees urology Dr. Gabriel, last seen June 8 2021, rec'd continue mgmt and f/u in 6 mths recheck UA, scan, BMP, and uric acid, stone study and KUB thereafter\par nephrology Dr. Gustavo Bowers; last seen 2020- was supposed to f/u in Jan 2021 and never scheduled apt., agrees to schedule f/u. obtaining reports\par o/w offers no concerns today\par check a1c & cmp, VitD\par \par COPD/ AR\par on Spiriva, Albuterol, Brio, Montelukast & Zyrtec\par u/k when was last time she followed up, agrees to reach out to specialist office to specify next f/u and have reports sent to PCP. - sees pulmonology located in [place], u/k name\par o/w offers no concerns\par check cbc\par \par bipolar disorder\par reports as well managed\par on Trazodone & Lamictal\par psychiatrist Chloé Buckley NP\par follows up telehealth monthly, specialist provides medication renewals\par o/w offers no concerns\par \par preventative health\par annual wellness- needs, scheduling \par flu vaccine- refused\par colonoscopy- incomplete colonoscopy 09/1/2019, rec'd for recheck in  years- repeating Oct. 2021.\par ophthalmology dilated eye exam- needs, scheduling \par GYN for Pap- last Pap 2021, abnormal result- scheduling apt. to return for recheck\par mammo- completed with GYN 2021- normal \par

## 2021-06-14 NOTE — PAST MEDICAL HISTORY
[Menstruating] : menstruating [Definite ___ (Date)] : the last menstrual period was [unfilled] [Irregular Cycle Intervals] : are  irregular [de-identified] : perimenopausal

## 2021-06-14 NOTE — CURRENT MEDS
[Takes medication as prescribed] : takes [None] : Patient does not have any barriers to medication adherence [Yes] : Reviewed medication list for presence of high-risk medications. [Opioids] : opioids [FreeTextEntry1] : counseled on use of medication

## 2021-06-15 LAB
25(OH)D3 SERPL-MCNC: 16.6 NG/ML
ALBUMIN SERPL ELPH-MCNC: 4.3 G/DL
ALP BLD-CCNC: 76 U/L
ALT SERPL-CCNC: 12 U/L
ANION GAP SERPL CALC-SCNC: 14 MMOL/L
AST SERPL-CCNC: 12 U/L
BASOPHILS # BLD AUTO: 0.12 K/UL
BASOPHILS NFR BLD AUTO: 1.2 %
BILIRUB SERPL-MCNC: 0.2 MG/DL
BUN SERPL-MCNC: 24 MG/DL
CALCIUM SERPL-MCNC: 9.1 MG/DL
CHLORIDE SERPL-SCNC: 99 MMOL/L
CHOLEST SERPL-MCNC: 239 MG/DL
CO2 SERPL-SCNC: 23 MMOL/L
CREAT SERPL-MCNC: 1.74 MG/DL
EOSINOPHIL # BLD AUTO: 0.42 K/UL
EOSINOPHIL NFR BLD AUTO: 4.3 %
ESTIMATED AVERAGE GLUCOSE: 160 MG/DL
GLUCOSE SERPL-MCNC: 139 MG/DL
HBA1C MFR BLD HPLC: 7.2 %
HCT VFR BLD CALC: 40.3 %
HDLC SERPL-MCNC: 57 MG/DL
HGB BLD-MCNC: 13 G/DL
IMM GRANULOCYTES NFR BLD AUTO: 0.2 %
LDLC SERPL CALC-MCNC: 139 MG/DL
LYMPHOCYTES # BLD AUTO: 2.95 K/UL
LYMPHOCYTES NFR BLD AUTO: 29.9 %
MAN DIFF?: NORMAL
MCHC RBC-ENTMCNC: 30 PG
MCHC RBC-ENTMCNC: 32.3 GM/DL
MCV RBC AUTO: 93.1 FL
MONOCYTES # BLD AUTO: 0.86 K/UL
MONOCYTES NFR BLD AUTO: 8.7 %
NEUTROPHILS # BLD AUTO: 5.51 K/UL
NEUTROPHILS NFR BLD AUTO: 55.7 %
NONHDLC SERPL-MCNC: 182 MG/DL
PLATELET # BLD AUTO: 241 K/UL
POTASSIUM SERPL-SCNC: 4.8 MMOL/L
PROT SERPL-MCNC: 7.3 G/DL
RBC # BLD: 4.33 M/UL
RBC # FLD: 13.8 %
SODIUM SERPL-SCNC: 136 MMOL/L
TRIGL SERPL-MCNC: 214 MG/DL
WBC # FLD AUTO: 9.88 K/UL

## 2021-06-15 RX ORDER — NYSTATIN 100000 [USP'U]/G
100000 POWDER TOPICAL
Qty: 60 | Refills: 0 | Status: COMPLETED | COMMUNITY
Start: 2021-02-26 | End: 2021-06-15

## 2021-06-15 RX ORDER — LAMOTRIGINE 200 MG/1
200 TABLET ORAL
Qty: 30 | Refills: 0 | Status: ACTIVE | COMMUNITY
Start: 2021-06-10

## 2021-06-15 RX ORDER — LAMOTRIGINE 150 MG/1
150 TABLET ORAL
Refills: 0 | Status: DISCONTINUED | COMMUNITY
End: 2021-06-15

## 2021-06-15 RX ORDER — CYCLOBENZAPRINE HYDROCHLORIDE 10 MG/1
10 TABLET, FILM COATED ORAL 3 TIMES DAILY
Refills: 0 | Status: ACTIVE | COMMUNITY

## 2021-06-15 RX ORDER — FLUTICASONE FUROATE AND VILANTEROL TRIFENATATE 200; 25 UG/1; UG/1
200-25 POWDER RESPIRATORY (INHALATION)
Refills: 0 | Status: ACTIVE | COMMUNITY

## 2021-06-15 RX ORDER — METOPROLOL SUCCINATE 50 MG/1
50 TABLET, EXTENDED RELEASE ORAL
Qty: 30 | Refills: 0 | Status: COMPLETED | COMMUNITY
Start: 2021-04-22 | End: 2021-06-15

## 2021-06-15 RX ORDER — ACETAMINOPHEN 325 MG/1
325 TABLET ORAL EVERY 6 HOURS
Refills: 0 | Status: ACTIVE | COMMUNITY

## 2021-06-15 RX ORDER — TRAZODONE HYDROCHLORIDE 100 MG/1
100 TABLET ORAL
Qty: 60 | Refills: 0 | Status: ACTIVE | COMMUNITY
Start: 2021-06-10

## 2021-06-15 RX ORDER — TIOTROPIUM BROMIDE INHALATION SPRAY 3.12 UG/1
2.5 SPRAY, METERED RESPIRATORY (INHALATION) DAILY
Refills: 0 | Status: ACTIVE | COMMUNITY

## 2021-06-15 RX ORDER — OXYCODONE AND ACETAMINOPHEN 10; 325 MG/1; MG/1
10-325 TABLET ORAL EVERY 6 HOURS
Refills: 0 | Status: DISCONTINUED | COMMUNITY
End: 2021-06-15

## 2021-06-15 RX ORDER — MONTELUKAST 10 MG/1
10 TABLET, FILM COATED ORAL
Refills: 0 | Status: ACTIVE | COMMUNITY

## 2021-06-15 RX ORDER — ALBUTEROL SULFATE 90 UG/1
108 (90 BASE) INHALANT RESPIRATORY (INHALATION) EVERY 6 HOURS
Refills: 0 | Status: ACTIVE | COMMUNITY

## 2021-06-15 RX ORDER — ASPIRIN 81 MG
81 TABLET, DELAYED RELEASE (ENTERIC COATED) ORAL DAILY
Refills: 0 | Status: ACTIVE | COMMUNITY

## 2021-06-15 RX ORDER — GABAPENTIN 600 MG/1
600 TABLET, COATED ORAL
Qty: 180 | Refills: 0 | Status: COMPLETED | COMMUNITY
Start: 2021-04-29

## 2021-06-15 RX ORDER — TRAZODONE HYDROCHLORIDE 150 MG/1
150 TABLET ORAL
Refills: 0 | Status: COMPLETED | COMMUNITY
End: 2021-06-15

## 2021-06-17 ENCOUNTER — RX RENEWAL (OUTPATIENT)
Age: 56
End: 2021-06-17

## 2021-07-21 ENCOUNTER — APPOINTMENT (OUTPATIENT)
Dept: PHYSICAL MEDICINE AND REHAB | Facility: CLINIC | Age: 56
End: 2021-07-21
Payer: MEDICARE

## 2021-07-21 VITALS
SYSTOLIC BLOOD PRESSURE: 131 MMHG | HEART RATE: 89 BPM | WEIGHT: 239 LBS | DIASTOLIC BLOOD PRESSURE: 81 MMHG | RESPIRATION RATE: 14 BRPM | TEMPERATURE: 97 F | BODY MASS INDEX: 42.35 KG/M2 | HEIGHT: 63 IN

## 2021-07-21 DIAGNOSIS — M79.18 MYALGIA, OTHER SITE: ICD-10-CM

## 2021-07-21 DIAGNOSIS — M43.06 SPONDYLOLYSIS, LUMBAR REGION: ICD-10-CM

## 2021-07-21 DIAGNOSIS — Z78.9 OTHER SPECIFIED HEALTH STATUS: ICD-10-CM

## 2021-07-21 DIAGNOSIS — M54.6 PAIN IN THORACIC SPINE: ICD-10-CM

## 2021-07-21 DIAGNOSIS — G89.29 MYALGIA, OTHER SITE: ICD-10-CM

## 2021-07-21 PROCEDURE — 99204 OFFICE O/P NEW MOD 45 MIN: CPT | Mod: GC

## 2021-07-21 NOTE — ASSESSMENT
[FreeTextEntry1] : 56 year old female PMHx htn/ hld/ DMT2 w neuropathy BLE, CKD/ COPD/ bipolar disorder/ chronic back pain s/p L5-S1 laminectomy done in 2016 who presents with chronic low back pain. Patient was on Percocet for chronic pain but was denied refill back in April due to failing urine tox and has not been on opioids since. MRI lumbar spine done 05/2021 showing multilevel degenerative spondylosis. No red flags endorsed.  No red flags seen on exam. Pain multifactorial likely involving lumbar spondylosis. Will recommend:\par \par -As aqua therapy helped in past, will give Rx to start Aqua therapy 1-2x/week. \par - Counseled patient that narcotics such as Percocet are not indicated for long standing musculoskeletal based pain. Patient understood.\par - Spoke about possibility of medical marijuana which patient was not interested in a this time. \par - Patient advised to follow up with psychiatrist about starting Cymbalta for her chronic musculoskeletal pain. Patient has history of bipolar disorder and currently on trazodone and lamotrigine. \par - Patient to follow up with her orthopedic spine surgeon in 09/2021\par - Advised to not take NSAIDs due to her elevated Cr and eGFR of 33. Patient to follow up with nephrologist this Monday and inquire about NSAID use. Can take Tylenol PRN for pain control,max 3g/day, which patient is in agreement. \par \par RTC in 6 weeks. Patient aware of red flag signs including any changes to their bowel/bladder control, groin numbness or new weakness. Patient knows to seek immediate attention by calling 911 or going to nearest ER if these symptoms appear. Patient in agreement with plan. \par \par I spent a total of 45 minutes on this encounter including documentation, face-to-face time, care coordination and reviewing prior records. \par \par  \par \par \par

## 2021-07-21 NOTE — PHYSICAL EXAM
[FreeTextEntry1] : PE:Entire PE done with MA Vandanashaniqua Waddell in room\par Constitutional: In NAD, sitting comfortably in chair, obese, calm and cooperative\par HEENT: NCAT, Anicteric sclera, no lid-lag\par Cardio: Extremities appear pink and well perfused, no peripheral edema\par Respiratory: Normal respiratory effort on room air, no accessory muscle use\par Skin: no rashes seen on exposed skin, no visible abrasions\par Psych: Normal affect, intact judgment and insight\par Neuro: Awake, alert and oriented x 3, see below for focused neurological exam\par MSK (Back)\par 	Inspection: no gross swelling identified\par 	Palpation: Tenderness of the bilateral lower lumbar and lower thoracic paraspinals L>R, some tenderness on bilateral PSIS \par 	ROM: Pain at end lumbar extension/flexion, limited ROM throughout \par 	Strength: 4+/5 on left ankle dorsiflexion (chronic as per patient) otherwise 5/5 strength in bilateral lower extremities\par 	Reflexes: 1+ Patella reflex bilaterally, 1+ Achilles reflex bilaterally, negative clonus bilaterally\par 	Sensation: Decreased to light touch in left the left lower extremity throughout in no specific dermatome \par Special tests: \par SLR: negative bilaterally \par YOANA: negative bilaterally \par FADIR: negative bilaterally \par Facet loading: positive bilaterally

## 2021-07-21 NOTE — DATA REVIEWED
[MRI] : MRI [FreeTextEntry1] : 5/2021 MRI lumbar spine \par \par Discs: There is multilevel disc dehydration and disc height loss.\par \par L1-2: There is diffuse disc bulge and superimposed central disc herniation.\par There is mild to moderate spinal canal stenosis. There is mild right foraminal\par narrowing.\par \par L2-3: There is mild disc bulge. There is no significant spinal canal or\par foraminal stenosis.\par \par L3-4: There is disc bulge and mild bilateral facet arthropathy. There is no\par significant spinal canal or foraminal stenosis.\par \par L4-5: There is diffuse disc bulge, bilateral facet arthropathy and ligamentum\par flavum buckling. There is moderate spinal canal stenosis. There is mild to\par moderate bilateral foraminal narrowing and encroachment upon the exiting both L4\par nerve roots. There are small midline synovial cyst without thecal sac\par compression.\par \par L5-S1: Status post posterior decompression. There is diffuse disc bulge. There\par is no residual spinal canal stenosis. Evaluation neural foramens are limited due\par to susceptibility artifacts. However disc bulges contacting the exiting both L5\par nerve roots.\par \par Overall degenerative spondylosis of the lumbar spine is mildly progressed\par compared to prior study dated January 8, 2019.\par \par \par L4-5: There is diffuse disc bulge, bilateral facet arthropathy and ligamentum\par flavum buckling. There is moderate spinal canal stenosis. There is mild to\par moderate bilateral foraminal narrowing and encroachment upon the exiting both L4\par nerve roots. There are small midline synovial cyst without thecal sac\par compression.\par \par L5-S1: Status post posterior decompression. There is diffuse disc bulge. There\par is no residual spinal canal stenosis. Evaluation neural foramens are limited due\par to susceptibility artifacts. However disc bulges contacting the exiting both L5\par nerve roots.\par \par Overall degenerative spondylosis of the lumbar spine is mildly progressed\par compared to prior study dated January 8, 2019.\par \par IMPRESSION:\par \par \par Status post L5-S1 laminectomy and posterior instrumented spinal fusion.\par \par Multilevel degenerative spondylosis detailed above.\par \par \par

## 2021-08-27 ENCOUNTER — RX RENEWAL (OUTPATIENT)
Age: 56
End: 2021-08-27

## 2021-10-08 ENCOUNTER — RX RENEWAL (OUTPATIENT)
Age: 56
End: 2021-10-08

## 2021-11-15 ENCOUNTER — APPOINTMENT (OUTPATIENT)
Dept: FAMILY MEDICINE | Facility: CLINIC | Age: 56
End: 2021-11-15
Payer: MEDICARE

## 2021-11-15 VITALS — BODY MASS INDEX: 40.57 KG/M2 | WEIGHT: 229 LBS

## 2021-11-15 VITALS — HEART RATE: 78 BPM | SYSTOLIC BLOOD PRESSURE: 130 MMHG | DIASTOLIC BLOOD PRESSURE: 80 MMHG

## 2021-11-15 VITALS — TEMPERATURE: 97.6 F | OXYGEN SATURATION: 97 % | HEART RATE: 82 BPM | HEIGHT: 63 IN

## 2021-11-15 PROCEDURE — G0008: CPT

## 2021-11-15 PROCEDURE — 36415 COLL VENOUS BLD VENIPUNCTURE: CPT

## 2021-11-15 PROCEDURE — 99213 OFFICE O/P EST LOW 20 MIN: CPT | Mod: 25

## 2021-11-15 PROCEDURE — 90686 IIV4 VACC NO PRSV 0.5 ML IM: CPT

## 2021-11-16 LAB
ESTIMATED AVERAGE GLUCOSE: 128 MG/DL
HBA1C MFR BLD HPLC: 6.1 %

## 2021-12-08 ENCOUNTER — APPOINTMENT (OUTPATIENT)
Dept: PHYSICAL MEDICINE AND REHAB | Facility: CLINIC | Age: 56
End: 2021-12-08

## 2021-12-10 ENCOUNTER — APPOINTMENT (OUTPATIENT)
Dept: FAMILY MEDICINE | Facility: CLINIC | Age: 56
End: 2021-12-10

## 2022-02-02 ENCOUNTER — APPOINTMENT (OUTPATIENT)
Dept: FAMILY MEDICINE | Facility: CLINIC | Age: 57
End: 2022-02-02

## 2022-02-07 ENCOUNTER — APPOINTMENT (OUTPATIENT)
Dept: FAMILY MEDICINE | Facility: CLINIC | Age: 57
End: 2022-02-07
Payer: MEDICARE

## 2022-02-07 VITALS — DIASTOLIC BLOOD PRESSURE: 75 MMHG | HEART RATE: 76 BPM | SYSTOLIC BLOOD PRESSURE: 124 MMHG

## 2022-02-07 VITALS
BODY MASS INDEX: 42.35 KG/M2 | OXYGEN SATURATION: 95 % | RESPIRATION RATE: 93 BRPM | TEMPERATURE: 98 F | WEIGHT: 239 LBS | HEIGHT: 63 IN

## 2022-02-07 DIAGNOSIS — R79.89 OTHER SPECIFIED ABNORMAL FINDINGS OF BLOOD CHEMISTRY: ICD-10-CM

## 2022-02-07 DIAGNOSIS — Z00.8 ENCOUNTER FOR OTHER GENERAL EXAMINATION: ICD-10-CM

## 2022-02-07 PROCEDURE — 99214 OFFICE O/P EST MOD 30 MIN: CPT

## 2022-02-07 NOTE — HISTORY OF PRESENT ILLNESS
[No Pertinent Cardiac History] : no history of aortic stenosis, atrial fibrillation, coronary artery disease, recent myocardial infarction, or implantable device/pacemaker [Asthma] : asthma [COPD] : COPD [Sleep Apnea] : sleep apnea [No Adverse Anesthesia Reaction] : no adverse anesthesia reaction in self or family member [Chronic Kidney Disease] : chronic kidney disease [Diabetes] : diabetes [Smoker] : not a smoker [Chronic Anticoagulation] : no chronic anticoagulation [FreeTextEntry1] : right eye [FreeTextEntry2] : 02/11/2022 [FreeTextEntry3] : nico

## 2022-02-07 NOTE — PHYSICAL EXAM
[No Acute Distress] : no acute distress [Well Nourished] : well nourished [Well Developed] : well developed [Well-Appearing] : well-appearing [Normal Sclera/Conjunctiva] : normal sclera/conjunctiva [PERRL] : pupils equal round and reactive to light [EOMI] : extraocular movements intact [Normal Outer Ear/Nose] : the outer ears and nose were normal in appearance [Normal Oropharynx] : the oropharynx was normal [No JVD] : no jugular venous distention [No Lymphadenopathy] : no lymphadenopathy [Supple] : supple [Thyroid Normal, No Nodules] : the thyroid was normal and there were no nodules present [No Respiratory Distress] : no respiratory distress  [No Accessory Muscle Use] : no accessory muscle use [Clear to Auscultation] : lungs were clear to auscultation bilaterally [Normal Rate] : normal rate  [Regular Rhythm] : with a regular rhythm [Normal S1, S2] : normal S1 and S2 [No Murmur] : no murmur heard [No Carotid Bruits] : no carotid bruits [No Abdominal Bruit] : a ~M bruit was not heard ~T in the abdomen [No Varicosities] : no varicosities [Pedal Pulses Present] : the pedal pulses are present [No Edema] : there was no peripheral edema [No Palpable Aorta] : no palpable aorta [No Extremity Clubbing/Cyanosis] : no extremity clubbing/cyanosis [Soft] : abdomen soft [Non Tender] : non-tender [Non-distended] : non-distended [No Masses] : no abdominal mass palpated [No HSM] : no HSM [Normal Bowel Sounds] : normal bowel sounds [Normal Posterior Cervical Nodes] : no posterior cervical lymphadenopathy [Normal Anterior Cervical Nodes] : no anterior cervical lymphadenopathy [No CVA Tenderness] : no CVA  tenderness [No Spinal Tenderness] : no spinal tenderness [No Joint Swelling] : no joint swelling [Grossly Normal Strength/Tone] : grossly normal strength/tone [No Rash] : no rash [Coordination Grossly Intact] : coordination grossly intact [No Focal Deficits] : no focal deficits [Normal Gait] : normal gait [Deep Tendon Reflexes (DTR)] : deep tendon reflexes were 2+ and symmetric [Normal Affect] : the affect was normal [Normal Insight/Judgement] : insight and judgment were intact [de-identified] : obesity

## 2022-02-08 LAB
ALBUMIN SERPL ELPH-MCNC: 4.2 G/DL
ALP BLD-CCNC: 66 U/L
ALT SERPL-CCNC: 11 U/L
ANION GAP SERPL CALC-SCNC: 18 MMOL/L
AST SERPL-CCNC: 13 U/L
BASOPHILS # BLD AUTO: 0.1 K/UL
BASOPHILS NFR BLD AUTO: 1.1 %
BILIRUB SERPL-MCNC: 0.2 MG/DL
BUN SERPL-MCNC: 21 MG/DL
CALCIUM SERPL-MCNC: 9.5 MG/DL
CHLORIDE SERPL-SCNC: 96 MMOL/L
CO2 SERPL-SCNC: 25 MMOL/L
CREAT SERPL-MCNC: 1.77 MG/DL
EOSINOPHIL # BLD AUTO: 0.34 K/UL
EOSINOPHIL NFR BLD AUTO: 3.7 %
GLUCOSE SERPL-MCNC: 200 MG/DL
HCT VFR BLD CALC: 38.3 %
HGB BLD-MCNC: 11.7 G/DL
IMM GRANULOCYTES NFR BLD AUTO: 0.2 %
LYMPHOCYTES # BLD AUTO: 2.79 K/UL
LYMPHOCYTES NFR BLD AUTO: 30.1 %
MAN DIFF?: NORMAL
MCHC RBC-ENTMCNC: 29.3 PG
MCHC RBC-ENTMCNC: 30.5 GM/DL
MCV RBC AUTO: 96 FL
MONOCYTES # BLD AUTO: 0.85 K/UL
MONOCYTES NFR BLD AUTO: 9.2 %
NEUTROPHILS # BLD AUTO: 5.18 K/UL
NEUTROPHILS NFR BLD AUTO: 55.7 %
PLATELET # BLD AUTO: 217 K/UL
POTASSIUM SERPL-SCNC: 5.3 MMOL/L
PROT SERPL-MCNC: 7 G/DL
RBC # BLD: 3.99 M/UL
RBC # FLD: 13.9 %
SODIUM SERPL-SCNC: 139 MMOL/L
WBC # FLD AUTO: 9.28 K/UL

## 2022-05-03 ENCOUNTER — RX RENEWAL (OUTPATIENT)
Age: 57
End: 2022-05-03

## 2022-05-03 RX ORDER — CETIRIZINE HYDROCHLORIDE 10 MG/1
10 TABLET, COATED ORAL
Qty: 30 | Refills: 11 | Status: ACTIVE | COMMUNITY
Start: 2020-04-14 | End: 1900-01-01

## 2022-06-28 ENCOUNTER — APPOINTMENT (OUTPATIENT)
Dept: FAMILY MEDICINE | Facility: CLINIC | Age: 57
End: 2022-06-28

## 2022-06-28 VITALS
HEART RATE: 109 BPM | WEIGHT: 234 LBS | TEMPERATURE: 97.3 F | HEIGHT: 63 IN | OXYGEN SATURATION: 94 % | BODY MASS INDEX: 41.46 KG/M2

## 2022-06-28 VITALS — SYSTOLIC BLOOD PRESSURE: 125 MMHG | DIASTOLIC BLOOD PRESSURE: 75 MMHG | HEART RATE: 88 BPM

## 2022-06-28 PROCEDURE — 36415 COLL VENOUS BLD VENIPUNCTURE: CPT

## 2022-06-28 PROCEDURE — 99214 OFFICE O/P EST MOD 30 MIN: CPT | Mod: 25

## 2022-06-29 LAB
ESTIMATED AVERAGE GLUCOSE: 157 MG/DL
HBA1C MFR BLD HPLC: 7.1 %

## 2022-09-14 ENCOUNTER — APPOINTMENT (OUTPATIENT)
Dept: FAMILY MEDICINE | Facility: CLINIC | Age: 57
End: 2022-09-14

## 2022-09-14 VITALS — DIASTOLIC BLOOD PRESSURE: 100 MMHG | SYSTOLIC BLOOD PRESSURE: 162 MMHG

## 2022-09-14 VITALS — DIASTOLIC BLOOD PRESSURE: 82 MMHG | SYSTOLIC BLOOD PRESSURE: 124 MMHG

## 2022-09-14 LAB
BILIRUB UR QL STRIP: NORMAL
CLARITY UR: CLEAR
COLLECTION METHOD: NORMAL
GLUCOSE UR-MCNC: NORMAL
HCG UR QL: 1 EU/DL
HGB UR QL STRIP.AUTO: NORMAL
KETONES UR-MCNC: NORMAL
LEUKOCYTE ESTERASE UR QL STRIP: NORMAL
NITRITE UR QL STRIP: NORMAL
PH UR STRIP: 7
PROT UR STRIP-MCNC: NORMAL
SP GR UR STRIP: 1.02

## 2022-09-14 PROCEDURE — 36415 COLL VENOUS BLD VENIPUNCTURE: CPT

## 2022-09-14 PROCEDURE — 99214 OFFICE O/P EST MOD 30 MIN: CPT | Mod: 25

## 2022-09-14 PROCEDURE — 81003 URINALYSIS AUTO W/O SCOPE: CPT | Mod: QW

## 2022-09-14 RX ORDER — SIMVASTATIN 40 MG/1
40 TABLET, FILM COATED ORAL
Qty: 90 | Refills: 10 | Status: DISCONTINUED | COMMUNITY
Start: 2020-05-05 | End: 2022-09-14

## 2022-09-14 RX ORDER — GLIPIZIDE 2.5 MG/1
2.5 TABLET, FILM COATED, EXTENDED RELEASE ORAL
Qty: 180 | Refills: 3 | Status: DISCONTINUED | COMMUNITY
Start: 2020-03-30 | End: 2022-09-14

## 2022-09-15 LAB
ALBUMIN SERPL ELPH-MCNC: 4.4 G/DL
ALP BLD-CCNC: 69 U/L
ALT SERPL-CCNC: 14 U/L
ANION GAP SERPL CALC-SCNC: 15 MMOL/L
AST SERPL-CCNC: 12 U/L
BASOPHILS # BLD AUTO: 0.12 K/UL
BASOPHILS NFR BLD AUTO: 0.9 %
BILIRUB SERPL-MCNC: 0.3 MG/DL
BUN SERPL-MCNC: 17 MG/DL
CALCIUM SERPL-MCNC: 9.5 MG/DL
CHLORIDE SERPL-SCNC: 99 MMOL/L
CO2 SERPL-SCNC: 23 MMOL/L
CREAT SERPL-MCNC: 1.98 MG/DL
EGFR: 29 ML/MIN/1.73M2
EOSINOPHIL # BLD AUTO: 0.53 K/UL
EOSINOPHIL NFR BLD AUTO: 4.2 %
GLUCOSE SERPL-MCNC: 177 MG/DL
HCT VFR BLD CALC: 38.8 %
HGB BLD-MCNC: 12 G/DL
IMM GRANULOCYTES NFR BLD AUTO: 0.4 %
LYMPHOCYTES # BLD AUTO: 2.65 K/UL
LYMPHOCYTES NFR BLD AUTO: 20.8 %
MAN DIFF?: NORMAL
MCHC RBC-ENTMCNC: 29.2 PG
MCHC RBC-ENTMCNC: 30.9 GM/DL
MCV RBC AUTO: 94.4 FL
MONOCYTES # BLD AUTO: 1.26 K/UL
MONOCYTES NFR BLD AUTO: 9.9 %
NEUTROPHILS # BLD AUTO: 8.16 K/UL
NEUTROPHILS NFR BLD AUTO: 63.8 %
PLATELET # BLD AUTO: 239 K/UL
POTASSIUM SERPL-SCNC: 5 MMOL/L
PROT SERPL-MCNC: 7 G/DL
RBC # BLD: 4.11 M/UL
RBC # FLD: 14.3 %
SODIUM SERPL-SCNC: 137 MMOL/L
WBC # FLD AUTO: 12.77 K/UL

## 2022-09-15 NOTE — REVIEW OF SYSTEMS
[Back Pain] : back pain [Fever] : no fever [Chills] : no chills [Night Sweats] : no night sweats [Discharge] : no discharge [Vision Problems] : no vision problems [Chest Pain] : no chest pain [Palpitations] : no palpitations [Lower Ext Edema] : no lower extremity edema [Shortness Of Breath] : no shortness of breath [Wheezing] : no wheezing [Dyspnea on Exertion] : no dyspnea on exertion [Abdominal Pain] : no abdominal pain [Nausea] : no nausea [Constipation] : no constipation [Diarrhea] : diarrhea [Vomiting] : no vomiting [Dysuria] : no dysuria [Hematuria] : no hematuria [Headache] : no headache [Dizziness] : no dizziness

## 2022-09-15 NOTE — HISTORY OF PRESENT ILLNESS
[No Pertinent Cardiac History] : no history of aortic stenosis, atrial fibrillation, coronary artery disease, recent myocardial infarction, or implantable device/pacemaker [Asthma] : asthma [COPD] : COPD [No Adverse Anesthesia Reaction] : no adverse anesthesia reaction in self or family member [Chronic Kidney Disease] : chronic kidney disease [Diabetes] : diabetes [(Patient denies any chest pain, claudication, dyspnea on exertion, orthopnea, palpitations or syncope)] : Patient denies any chest pain, claudication, dyspnea on exertion, orthopnea, palpitations or syncope [Anti-Platelet Agents: _____] : Anti-Platelet Agents: [unfilled] [Sleep Apnea] : no sleep apnea [Smoker] : not a smoker [Chronic Anticoagulation] : no chronic anticoagulation [FreeTextEntry1] : Back surgery [FreeTextEntry2] : 9/16/2022 [FreeTextEntry3] : Dr. Jesse Shaikh,  [FreeTextEntry4] : 56 y/o female with pmhx of t2dm, HTN, COPD, HLD, bipolar disorder, gout, CKD, presents for preop evaluation.\par \par Has a history of HTN and COPD.  Denies chest pain at rest or on exertion, denies shortness of breath. Never smoker. Has not had adverse reactions to anesthesia in the past. States he is able to walk up a flight of stairs without chest pain or dyspnea.\par  [FreeTextEntry7] : 9/7/2022 EKG: NSR, no ST or T wave changes compared to prior\par

## 2022-09-15 NOTE — ASSESSMENT
[Modify anti-platelet treatment prior to procedure] : Modify anti-platelet treatment prior to procedure [Modify medications prior to procedure] : Modify medications prior to procedure [As per surgery] : as per surgery [Patient Optimized for Surgery] : Patient optimized for surgery [FreeTextEntry4] : Elevated BP on arrival likely 2/2 to back pain, repeat manual BP of 124/82 -patient to continue taking metoprolol tartrate 75mg BID\par \par Preop labs reviewed: CBC with WBC of 16.19, no active signs or symptoms of infection, however noted to have UA with trace leukocyte esterase, 2 WBC and few bacteria\par CMP with cr. of 2.03, has history of CKD with baseline cr of 1.77 in 2/2022 \par \par Labs from 9/14: CBC with improvement of WBC to 12.77, UA without any evidence of infection and patient asymptomatic\par CMP with improvement of Cr. to 1.98, which is closer to patients baseline\par \par EKG: NSR, no ST or T wave changes\par \par \par Patient is high risk for intermediate risk procedure. Medical clearance obtained by pulmonology and cardiology. Patient is medically optimized for planned procedure with no modifiable risk factors\par  [FreeTextEntry6] : hold aspirin 81mg 1 week prior to surgery [FreeTextEntry7] : hold metformin day of surgery

## 2022-09-16 LAB — BACTERIA UR CULT: NORMAL

## 2023-01-18 ENCOUNTER — APPOINTMENT (OUTPATIENT)
Dept: FAMILY MEDICINE | Facility: CLINIC | Age: 58
End: 2023-01-18
Payer: MEDICARE

## 2023-01-18 VITALS
SYSTOLIC BLOOD PRESSURE: 128 MMHG | RESPIRATION RATE: 14 BRPM | TEMPERATURE: 97.3 F | OXYGEN SATURATION: 97 % | DIASTOLIC BLOOD PRESSURE: 82 MMHG | HEART RATE: 82 BPM

## 2023-01-18 DIAGNOSIS — N28.9 DISORDER OF KIDNEY AND URETER, UNSPECIFIED: ICD-10-CM

## 2023-01-18 DIAGNOSIS — E11.65 TYPE 2 DIABETES MELLITUS WITH HYPERGLYCEMIA: ICD-10-CM

## 2023-01-18 DIAGNOSIS — M54.50 LOW BACK PAIN, UNSPECIFIED: ICD-10-CM

## 2023-01-18 DIAGNOSIS — G89.29 LOW BACK PAIN, UNSPECIFIED: ICD-10-CM

## 2023-01-18 DIAGNOSIS — E11.9 TYPE 2 DIABETES MELLITUS W/OUT COMPLICATIONS: ICD-10-CM

## 2023-01-18 DIAGNOSIS — R32 UNSPECIFIED URINARY INCONTINENCE: ICD-10-CM

## 2023-01-18 PROCEDURE — 99214 OFFICE O/P EST MOD 30 MIN: CPT | Mod: 25

## 2023-01-18 PROCEDURE — 36415 COLL VENOUS BLD VENIPUNCTURE: CPT

## 2023-01-18 RX ORDER — BLOOD-GLUCOSE METER
W/DEVICE KIT MISCELLANEOUS
Qty: 1 | Refills: 0 | Status: ACTIVE | COMMUNITY
Start: 2023-01-18 | End: 1900-01-01

## 2023-01-19 ENCOUNTER — NON-APPOINTMENT (OUTPATIENT)
Age: 58
End: 2023-01-19

## 2023-01-19 LAB
ALBUMIN SERPL ELPH-MCNC: 4.3 G/DL
ALP BLD-CCNC: 77 U/L
ALT SERPL-CCNC: 14 U/L
ANION GAP SERPL CALC-SCNC: 16 MMOL/L
AST SERPL-CCNC: 12 U/L
BASOPHILS # BLD AUTO: 0.12 K/UL
BASOPHILS NFR BLD AUTO: 1.1 %
BILIRUB SERPL-MCNC: 0.2 MG/DL
BUN SERPL-MCNC: 27 MG/DL
CALCIUM SERPL-MCNC: 9.3 MG/DL
CHLORIDE SERPL-SCNC: 104 MMOL/L
CO2 SERPL-SCNC: 21 MMOL/L
CREAT SERPL-MCNC: 2.2 MG/DL
EGFR: 26 ML/MIN/1.73M2
EOSINOPHIL # BLD AUTO: 0.34 K/UL
EOSINOPHIL NFR BLD AUTO: 3.2 %
ESTIMATED AVERAGE GLUCOSE: 212 MG/DL
GLUCOSE SERPL-MCNC: 123 MG/DL
HBA1C MFR BLD HPLC: 9 %
HCT VFR BLD CALC: 37 %
HGB BLD-MCNC: 11.5 G/DL
IMM GRANULOCYTES NFR BLD AUTO: 0.3 %
LYMPHOCYTES # BLD AUTO: 2.85 K/UL
LYMPHOCYTES NFR BLD AUTO: 26.6 %
MAN DIFF?: NORMAL
MCHC RBC-ENTMCNC: 28.1 PG
MCHC RBC-ENTMCNC: 31.1 GM/DL
MCV RBC AUTO: 90.5 FL
MONOCYTES # BLD AUTO: 0.79 K/UL
MONOCYTES NFR BLD AUTO: 7.4 %
NEUTROPHILS # BLD AUTO: 6.59 K/UL
NEUTROPHILS NFR BLD AUTO: 61.4 %
PLATELET # BLD AUTO: 217 K/UL
POTASSIUM SERPL-SCNC: 3.8 MMOL/L
PROT SERPL-MCNC: 7 G/DL
RBC # BLD: 4.09 M/UL
RBC # FLD: 15.6 %
SODIUM SERPL-SCNC: 141 MMOL/L
WBC # FLD AUTO: 10.72 K/UL

## 2023-01-19 NOTE — ASSESSMENT
[FreeTextEntry1] : Back pain s/p lumbar spine surgery in 9/2022\par s/p rehab, now improving\par continue gabapentin\par \par Type 2 DM\par continue tradjenta 5mg daily, jardiance 10mg daily, and glipizide 10mg bid\par will check HgbA1c today, to be drawn in office, and make adjustments to medications accordingly\par will order blood gluocose meter, patient to bring in log of fasting sugars to her next visit\par -pt to follow up in 3 months\par \par Gout\par continue allopurinol 100mg daily\par \par Urinary incontinence\par continue gemtesa 75mg daily\par \par GERD\par continue pantoprazole daily\par \par COPD\par continue albuterol as needed\par continue montelukast 10mg daily\par continue spiriva and breo ellipta inhalers\par \par hx of bipolar disorder\par continue lamotrigine 200mg daily and trazodone 100mg daily

## 2023-01-19 NOTE — HISTORY OF PRESENT ILLNESS
[FreeTextEntry1] : Pt is here to follow up on rehab. [de-identified] : 58 y/o female with pmhx of t2dm, HTN, COPD, HLD, bipolar disorder, gout, CKD, presents for follow up. Patient underwent lumbar spine surgery in 9/2022. After surgeyr, patient went to subacute rehab from 9/2022 to 12/2022. Patient states that she has been doing well since being back home. Her surgical incision healed well and her pain is well controlled.\par

## 2023-01-19 NOTE — PHYSICAL EXAM
[Coordination Grossly Intact] : coordination grossly intact [No Focal Deficits] : no focal deficits [Normal] : affect was normal and insight and judgment were intact [de-identified] : incision well healed

## 2023-03-13 ENCOUNTER — NON-APPOINTMENT (OUTPATIENT)
Age: 58
End: 2023-03-13

## 2023-04-19 ENCOUNTER — APPOINTMENT (OUTPATIENT)
Dept: FAMILY MEDICINE | Facility: CLINIC | Age: 58
End: 2023-04-19

## 2023-07-19 ENCOUNTER — LABORATORY RESULT (OUTPATIENT)
Age: 58
End: 2023-07-19

## 2023-07-19 ENCOUNTER — APPOINTMENT (OUTPATIENT)
Dept: FAMILY MEDICINE | Facility: CLINIC | Age: 58
End: 2023-07-19
Payer: MEDICARE

## 2023-07-19 VITALS
OXYGEN SATURATION: 98 % | DIASTOLIC BLOOD PRESSURE: 80 MMHG | WEIGHT: 222 LBS | BODY MASS INDEX: 39.34 KG/M2 | HEART RATE: 89 BPM | HEIGHT: 63 IN | SYSTOLIC BLOOD PRESSURE: 118 MMHG | TEMPERATURE: 97.5 F

## 2023-07-19 DIAGNOSIS — R30.0 DYSURIA: ICD-10-CM

## 2023-07-19 DIAGNOSIS — Z12.31 ENCOUNTER FOR SCREENING MAMMOGRAM FOR MALIGNANT NEOPLASM OF BREAST: ICD-10-CM

## 2023-07-19 DIAGNOSIS — Z00.00 ENCOUNTER FOR GENERAL ADULT MEDICAL EXAMINATION W/OUT ABNORMAL FINDINGS: ICD-10-CM

## 2023-07-19 DIAGNOSIS — K21.9 GASTRO-ESOPHAGEAL REFLUX DISEASE W/OUT ESOPHAGITIS: ICD-10-CM

## 2023-07-19 DIAGNOSIS — Z23 ENCOUNTER FOR IMMUNIZATION: ICD-10-CM

## 2023-07-19 DIAGNOSIS — Z12.11 ENCOUNTER FOR SCREENING FOR MALIGNANT NEOPLASM OF COLON: ICD-10-CM

## 2023-07-19 PROCEDURE — 36415 COLL VENOUS BLD VENIPUNCTURE: CPT

## 2023-07-19 PROCEDURE — 90471 IMMUNIZATION ADMIN: CPT

## 2023-07-19 PROCEDURE — 90715 TDAP VACCINE 7 YRS/> IM: CPT

## 2023-07-19 PROCEDURE — G0439: CPT

## 2023-07-19 RX ORDER — POTASSIUM CITRATE 15 MEQ/1
15 MEQ TABLET, EXTENDED RELEASE ORAL
Refills: 0 | Status: DISCONTINUED | COMMUNITY
Start: 2021-06-01 | End: 2023-07-19

## 2023-07-19 RX ORDER — METOPROLOL TARTRATE 75 MG/1
75 TABLET, FILM COATED ORAL DAILY
Refills: 0 | Status: ACTIVE | COMMUNITY
Start: 2021-06-15

## 2023-07-19 RX ORDER — GABAPENTIN 800 MG/1
800 TABLET, COATED ORAL 3 TIMES DAILY
Refills: 0 | Status: DISCONTINUED | COMMUNITY
Start: 2023-01-18 | End: 2023-07-19

## 2023-07-19 NOTE — ASSESSMENT
[FreeTextEntry1] : CPE:\par -will order CBC w/ diff, CMP, Lipid, TSH and HgbA1c, labs to be drawn in office\par -Breast cancer screening: due, will order mammogram\par -Cervical Cancer Screening: martha she had pap smear within the past 3 years, follows with Dr. Hayes\par -Colon Cancer screening: due, will order fit-dna test\par -Vaccinations: Pneumococcal - up to date, Shingles  - recommended\par -patient expressed understanding of plan, all questions answered\par \par Back pain s/p lumbar spine surgery in 9/2022\par s/p rehab, now improving\par continue gabapentin, cyclobenzaprine and tylenol as prescribed by pain management\par would avoid nsaids in setting of ckd\par \par Type 2 DM\par continue tradjenta 5mg daily, jardiance 10mg daily, and glipizide 10mg bid\par will check HgbA1c today, to be drawn in office, and make adjustments to medications accordingly\par -pt to follow up in 3 months\par \par CKD\par will check cmp and vitamin d levels today\par follow up with nephrology\par \par Gout\par continue allopurinol 100mg daily\par \par Urinary incontinence\par continue gemtesa 75mg daily\par \par GERD\par continue pantoprazole daily\par \par COPD\par continue albuterol as needed\par continue montelukast 10mg daily\par continue spiriva and breo ellipta inhalers\par \par hx of bipolar disorder\par continue lamotrigine 200mg daily and trazodone 100mg daily

## 2023-07-19 NOTE — HISTORY OF PRESENT ILLNESS
[FreeTextEntry1] : cpe [de-identified] : 58 y/o female with pmhx of t2dm, HTN, COPD, HLD, bipolar disorder, gout, CKD, presents for cpe. \par \par Patient underwent lumbar spine surgery in 9/2022. After surgery, patient went to subacute rehab from 9/2022 to 12/2022. Patient is currently following with [facility] for pain management\par \par For CKD, patient is seeing a nephrologist Dr. Ponce, will be seeing him later this month. Patient is also following with Dr. Mckeon urology for urinary incontinence \par \par Patient follows with Cardiology - Dr. Mahajan\par \par For Type 2 Diabetes, patient states that her fasting  morning blood sugars have been between 88-150s She is currently on glipizide er 10mg bid, jardiance 10mg daily and tradjenta 5mg daily

## 2023-07-19 NOTE — HEALTH RISK ASSESSMENT
[Yes] : Yes [Monthly or less (1 pt)] : Monthly or less (1 point) [1 or 2 (0 pts)] : 1 or 2 (0 points) [Never (0 pts)] : Never (0 points) [No] : In the past 12 months have you used drugs other than those required for medical reasons? No [No falls in past year] : Patient reported no falls in the past year [Medical reason not done] : Medical reason not done [With Family] : lives with family [On disability] : on disability [Fully functional (bathing, dressing, toileting, transferring, walking, feeding)] : Fully functional (bathing, dressing, toileting, transferring, walking, feeding) [Independent] : managing finances [Some assistance needed] : using transportation [Never] : Never [Audit-CScore] : 1 [de-identified] : history of bipolar disorder

## 2023-07-21 DIAGNOSIS — N39.0 URINARY TRACT INFECTION, SITE NOT SPECIFIED: ICD-10-CM

## 2023-07-21 LAB
25(OH)D3 SERPL-MCNC: 24 NG/ML
ALBUMIN SERPL ELPH-MCNC: 4.5 G/DL
ALP BLD-CCNC: 80 U/L
ALT SERPL-CCNC: 13 U/L
ANION GAP SERPL CALC-SCNC: 16 MMOL/L
APPEARANCE: CLEAR
AST SERPL-CCNC: 15 U/L
BILIRUB SERPL-MCNC: 0.3 MG/DL
BILIRUBIN URINE: NEGATIVE
BLOOD URINE: NEGATIVE
BUN SERPL-MCNC: 22 MG/DL
CALCIUM SERPL-MCNC: 9.1 MG/DL
CHLORIDE SERPL-SCNC: 101 MMOL/L
CHOLEST SERPL-MCNC: 160 MG/DL
CO2 SERPL-SCNC: 19 MMOL/L
COLOR: YELLOW
CREAT SERPL-MCNC: 2.3 MG/DL
EGFR: 24 ML/MIN/1.73M2
ESTIMATED AVERAGE GLUCOSE: 166 MG/DL
GLUCOSE QUALITATIVE U: 500 MG/DL
GLUCOSE SERPL-MCNC: 75 MG/DL
HBA1C MFR BLD HPLC: 7.4 %
HDLC SERPL-MCNC: 42 MG/DL
KETONES URINE: NEGATIVE MG/DL
LDLC SERPL CALC-MCNC: 82 MG/DL
LEUKOCYTE ESTERASE URINE: ABNORMAL
NITRITE URINE: NEGATIVE
NONHDLC SERPL-MCNC: 118 MG/DL
PH URINE: 6
POTASSIUM SERPL-SCNC: 4 MMOL/L
PROT SERPL-MCNC: 7.3 G/DL
PROTEIN URINE: 30 MG/DL
SODIUM SERPL-SCNC: 136 MMOL/L
SPECIFIC GRAVITY URINE: 1.01
TRIGL SERPL-MCNC: 215 MG/DL
TSH SERPL-ACNC: 1.58 UIU/ML
UROBILINOGEN URINE: 0.2 MG/DL
VIT B12 SERPL-MCNC: 756 PG/ML

## 2023-07-21 RX ORDER — BLOOD-GLUCOSE METER
KIT MISCELLANEOUS DAILY
Qty: 1 | Refills: 1 | Status: ACTIVE | COMMUNITY
Start: 2023-01-18 | End: 1900-01-01

## 2023-07-21 RX ORDER — LANCETS 33 GAUGE
EACH MISCELLANEOUS
Qty: 1 | Refills: 1 | Status: ACTIVE | COMMUNITY
Start: 2023-01-18 | End: 1900-01-01

## 2023-08-01 RX ORDER — LANCETS 30 GAUGE
EACH MISCELLANEOUS
Qty: 1 | Refills: 0 | Status: ACTIVE | COMMUNITY
Start: 2023-08-01 | End: 1900-01-01

## 2023-08-22 ENCOUNTER — OFFICE (OUTPATIENT)
Dept: URBAN - METROPOLITAN AREA CLINIC 104 | Facility: CLINIC | Age: 58
Setting detail: OPHTHALMOLOGY
End: 2023-08-22
Payer: COMMERCIAL

## 2023-08-22 DIAGNOSIS — H26.493: ICD-10-CM

## 2023-08-22 DIAGNOSIS — Z79.899: ICD-10-CM

## 2023-08-22 DIAGNOSIS — E11.9: ICD-10-CM

## 2023-08-22 DIAGNOSIS — H43.813: ICD-10-CM

## 2023-08-22 DIAGNOSIS — Z96.1: ICD-10-CM

## 2023-08-22 PROCEDURE — 92014 COMPRE OPH EXAM EST PT 1/>: CPT | Performed by: SPECIALIST

## 2023-08-22 PROCEDURE — 92015 DETERMINE REFRACTIVE STATE: CPT | Performed by: SPECIALIST

## 2023-08-22 ASSESSMENT — REFRACTION_AUTOREFRACTION
OD_CYLINDER: -1.50
OS_CYLINDER: -3.25
OD_AXIS: 179
OD_SPHERE: +1.25
OS_AXIS: 156
OS_SPHERE: +1.50

## 2023-08-22 ASSESSMENT — VISUAL ACUITY
OD_BCVA: 20/40
OS_BCVA: 20/30-2

## 2023-08-22 ASSESSMENT — REFRACTION_MANIFEST
OD_CYLINDER: -1.25
OD_AXIS: 179
OS_VA1: 20/40
OS_CYLINDER: -3.00
OS_AXIS: 155
OD_VA1: 20/25
OD_SPHERE: +1.00
OS_SPHERE: +1.50

## 2023-08-22 ASSESSMENT — TONOMETRY
OS_IOP_MMHG: 14
OD_IOP_MMHG: 14

## 2023-08-22 ASSESSMENT — SPHEQUIV_DERIVED
OD_SPHEQUIV: 0.375
OD_SPHEQUIV: 0.5
OS_SPHEQUIV: -0.125
OS_SPHEQUIV: 0

## 2023-08-22 ASSESSMENT — CONFRONTATIONAL VISUAL FIELD TEST (CVF)
OD_FINDINGS: FULL
OS_FINDINGS: FULL

## 2023-10-06 DIAGNOSIS — Z23 ENCOUNTER FOR IMMUNIZATION: ICD-10-CM

## 2023-10-18 ENCOUNTER — APPOINTMENT (OUTPATIENT)
Dept: FAMILY MEDICINE | Facility: CLINIC | Age: 58
End: 2023-10-18
Payer: MEDICARE

## 2023-10-18 PROCEDURE — 90677 PCV20 VACCINE IM: CPT

## 2023-10-18 PROCEDURE — G0009: CPT

## 2023-10-18 PROCEDURE — G0008: CPT

## 2023-10-18 PROCEDURE — 90686 IIV4 VACC NO PRSV 0.5 ML IM: CPT

## 2023-11-07 DIAGNOSIS — N63.0 UNSPECIFIED LUMP IN UNSPECIFIED BREAST: ICD-10-CM

## 2023-11-09 PROBLEM — N63.0 BREAST NODULE: Status: ACTIVE | Noted: 2023-11-01

## 2023-11-14 ENCOUNTER — APPOINTMENT (OUTPATIENT)
Dept: FAMILY MEDICINE | Facility: CLINIC | Age: 58
End: 2023-11-14
Payer: MEDICARE

## 2023-11-14 VITALS
HEART RATE: 77 BPM | OXYGEN SATURATION: 97 % | BODY MASS INDEX: 37.55 KG/M2 | RESPIRATION RATE: 16 BRPM | WEIGHT: 212 LBS | DIASTOLIC BLOOD PRESSURE: 82 MMHG | SYSTOLIC BLOOD PRESSURE: 118 MMHG | TEMPERATURE: 97.3 F

## 2023-11-14 DIAGNOSIS — R21 RASH AND OTHER NONSPECIFIC SKIN ERUPTION: ICD-10-CM

## 2023-11-14 DIAGNOSIS — B36.9 SUPERFICIAL MYCOSIS, UNSPECIFIED: ICD-10-CM

## 2023-11-14 PROCEDURE — 99213 OFFICE O/P EST LOW 20 MIN: CPT

## 2023-11-14 RX ORDER — NYSTATIN 100000 1/G
100000 POWDER TOPICAL
Qty: 1 | Refills: 0 | Status: ACTIVE | COMMUNITY
Start: 2023-11-14 | End: 1900-01-01

## 2023-11-14 RX ORDER — TRIAMCINOLONE ACETONIDE 1 MG/G
0.1 CREAM TOPICAL
Qty: 1 | Refills: 0 | Status: ACTIVE | COMMUNITY
Start: 2023-11-14 | End: 1900-01-01

## 2023-11-14 RX ORDER — CLOTRIMAZOLE 10 MG/G
1 CREAM TOPICAL 3 TIMES DAILY
Qty: 1 | Refills: 0 | Status: ACTIVE | COMMUNITY
Start: 2023-11-14 | End: 1900-01-01

## 2023-12-04 ENCOUNTER — NON-APPOINTMENT (OUTPATIENT)
Age: 58
End: 2023-12-04

## 2023-12-05 ENCOUNTER — NON-APPOINTMENT (OUTPATIENT)
Age: 58
End: 2023-12-05

## 2024-03-05 ENCOUNTER — APPOINTMENT (OUTPATIENT)
Dept: FAMILY MEDICINE | Facility: CLINIC | Age: 59
End: 2024-03-05

## 2024-03-27 ENCOUNTER — APPOINTMENT (OUTPATIENT)
Dept: FAMILY MEDICINE | Facility: CLINIC | Age: 59
End: 2024-03-27

## 2024-04-03 RX ORDER — VIBEGRON 75 MG/1
75 TABLET, FILM COATED ORAL
Qty: 90 | Refills: 1 | Status: ACTIVE | COMMUNITY
Start: 2023-01-18 | End: 1900-01-01

## 2024-04-19 ENCOUNTER — APPOINTMENT (OUTPATIENT)
Dept: ORTHOPEDIC SURGERY | Facility: CLINIC | Age: 59
End: 2024-04-19

## 2024-04-22 ENCOUNTER — APPOINTMENT (OUTPATIENT)
Dept: ORTHOPEDIC SURGERY | Facility: CLINIC | Age: 59
End: 2024-04-22
Payer: MEDICARE

## 2024-04-22 VITALS — HEIGHT: 64 IN

## 2024-04-22 DIAGNOSIS — M25.559 PAIN IN UNSPECIFIED HIP: ICD-10-CM

## 2024-04-22 DIAGNOSIS — Z96.649 PAIN IN UNSPECIFIED HIP: ICD-10-CM

## 2024-04-22 DIAGNOSIS — M17.12 UNILATERAL PRIMARY OSTEOARTHRITIS, LEFT KNEE: ICD-10-CM

## 2024-04-22 DIAGNOSIS — S83.249A OTHER TEAR OF MEDIAL MENISCUS, CURRENT INJURY, UNSPECIFIED KNEE, INITIAL ENCOUNTER: ICD-10-CM

## 2024-04-22 DIAGNOSIS — N18.9 CHRONIC KIDNEY DISEASE, UNSPECIFIED: ICD-10-CM

## 2024-04-22 DIAGNOSIS — M16.12 UNILATERAL PRIMARY OSTEOARTHRITIS, LEFT HIP: ICD-10-CM

## 2024-04-22 PROCEDURE — 73502 X-RAY EXAM HIP UNI 2-3 VIEWS: CPT

## 2024-04-22 PROCEDURE — 73560 X-RAY EXAM OF KNEE 1 OR 2: CPT | Mod: 50

## 2024-04-22 PROCEDURE — 99204 OFFICE O/P NEW MOD 45 MIN: CPT

## 2024-04-22 NOTE — DATA REVIEWED
[MRI] : MRI [Left] : left [Knee] : knee [Report was reviewed and noted in the chart] : The report was reviewed and noted in the chart [I reviewed the films/CD and agree] : I reviewed the films/CD and agree [FreeTextEntry1] : Extensive tear and degeneration of lateral meniscus with partial extrusion of body segment.  Mild tricompartmental degenerative disease. Cartilage loss is more pronounced of lateral plateau.

## 2024-04-22 NOTE — PHYSICAL EXAM
[NL (0)] : extension 0 degrees [5___] : hamstring 5[unfilled]/5 [Equivocal] : equivocal Melani [Left] : left hip [4___] : abduction 4[unfilled]/5 [FreeTextEntry9] : extensive grinding crepitus on hip ROM [] : no effusion [TWNoteComboBox7] : flexion 120 degrees

## 2024-04-22 NOTE — HISTORY OF PRESENT ILLNESS
[de-identified] : 4.22.24 NEW PATIENT HERE FOR LEFT KNEE PAIN.  She notes that she had prior back surgery and as a baseline ambulates with a cane.  She reports an episode at the end of February where the left knee buckled on her.  Since then she has been ambulating with a walker.  She saw her spine surgeon who had an MRI done of the left knee and she presents for orthopedic evaluation  She is known to have chronic renal insufficiency and also diabetes and COPD

## 2024-04-22 NOTE — ASSESSMENT
[FreeTextEntry1] : Referred to one of our joint replacement specialists to assess the fractured ceramic femoral head on left Likely needs revision surgery No surgery on left knee as likely wouldn't get good result secondary to the OA present

## 2024-05-10 ENCOUNTER — APPOINTMENT (OUTPATIENT)
Dept: ORTHOPEDIC SURGERY | Facility: CLINIC | Age: 59
End: 2024-05-10

## 2024-05-10 ENCOUNTER — APPOINTMENT (OUTPATIENT)
Dept: INTERNAL MEDICINE | Facility: CLINIC | Age: 59
End: 2024-05-10
Payer: MEDICARE

## 2024-05-10 ENCOUNTER — LABORATORY RESULT (OUTPATIENT)
Age: 59
End: 2024-05-10

## 2024-05-10 VITALS
SYSTOLIC BLOOD PRESSURE: 105 MMHG | BODY MASS INDEX: 35.17 KG/M2 | WEIGHT: 206 LBS | HEIGHT: 64 IN | HEART RATE: 85 BPM | DIASTOLIC BLOOD PRESSURE: 60 MMHG | OXYGEN SATURATION: 98 %

## 2024-05-10 DIAGNOSIS — J44.9 CHRONIC OBSTRUCTIVE PULMONARY DISEASE, UNSPECIFIED: ICD-10-CM

## 2024-05-10 DIAGNOSIS — I10 ESSENTIAL (PRIMARY) HYPERTENSION: ICD-10-CM

## 2024-05-10 DIAGNOSIS — E78.5 HYPERLIPIDEMIA, UNSPECIFIED: ICD-10-CM

## 2024-05-10 DIAGNOSIS — E11.9 TYPE 2 DIABETES MELLITUS W/OUT COMPLICATIONS: ICD-10-CM

## 2024-05-10 PROCEDURE — 36415 COLL VENOUS BLD VENIPUNCTURE: CPT

## 2024-05-10 PROCEDURE — 99214 OFFICE O/P EST MOD 30 MIN: CPT

## 2024-05-10 RX ORDER — NAPROXEN 500 MG/1
500 TABLET ORAL
Refills: 0 | Status: DISCONTINUED | COMMUNITY
Start: 2020-10-28 | End: 2024-05-10

## 2024-05-10 RX ORDER — ALLOPURINOL 100 MG/1
100 TABLET ORAL DAILY
Qty: 90 | Refills: 3 | Status: ACTIVE | COMMUNITY
Start: 2022-02-07 | End: 1900-01-01

## 2024-05-10 RX ORDER — EMPAGLIFLOZIN 25 MG/1
25 TABLET, FILM COATED ORAL DAILY
Qty: 90 | Refills: 1 | Status: ACTIVE | COMMUNITY
Start: 2023-01-18 | End: 1900-01-01

## 2024-05-10 RX ORDER — ROSUVASTATIN CALCIUM 20 MG/1
20 TABLET, FILM COATED ORAL
Qty: 90 | Refills: 2 | Status: ACTIVE | COMMUNITY
Start: 2021-11-15 | End: 1900-01-01

## 2024-05-10 RX ORDER — LINAGLIPTIN 5 MG/1
5 TABLET, FILM COATED ORAL
Qty: 90 | Refills: 1 | Status: ACTIVE | COMMUNITY
Start: 2020-06-23 | End: 1900-01-01

## 2024-05-10 RX ORDER — NITROFURANTOIN (MONOHYDRATE/MACROCRYSTALS) 25; 75 MG/1; MG/1
100 CAPSULE ORAL
Qty: 10 | Refills: 0 | Status: DISCONTINUED | COMMUNITY
Start: 2023-07-21 | End: 2024-05-10

## 2024-05-10 NOTE — HISTORY OF PRESENT ILLNESS
[FreeTextEntry1] : follow up [de-identified] : 59 y/o female with pmhx of t2dm, HTN, COPD, HLD, bipolar disorder, gout, CKD, presents for follow up.  Patient states that in feburary 2024, she passed out due to hypoglycemia. She went to the hospital and was admitted. States she had been not eating due to issues with her food stamps. States she has secure food access now. Denies any recent episodes of hypoglycemia. Currently on glipizide 10mg bid, tradjenta 5mg daily and jardiance 25mg daily  She will be following up with ortho due to left hip pain, may possibly be going for hip surgery  Patient is requesting a renewal of naproxen that has been previously prescribed by her podiatrist due to her arthritis pain.

## 2024-05-10 NOTE — ASSESSMENT
[FreeTextEntry1] : Arthritis pain Had extensive discussion with patient about the dangers of the use of naproxen in the setting of her CKD Discussed that naproxen could worsen her kidney function and ultimately lead to renal failure and the need for dialysis Advised patient to try Tylenol 1000 mg as needed up to 3 times a day  Back pain s/p lumbar spine surgery in 9/2022 continue gabapentin, cyclobenzaprine and tylenol as prescribed by pain management would avoid nsaids in setting of ckd  Type 2 DM advised patient to not take glipizide if she does not eat as it could cause hypoglycemia continue tradjenta 5mg daily, jardiance 10mg daily, and glipizide 10mg bid will check HgbA1c today, to be drawn in office, and make adjustments to medications accordingly -pt to follow up in 3 months  CKD will check cmp and vitamin d levels today follow up with nephrology  Gout continue allopurinol 100mg daily  Urinary incontinence continue gemtesa 75mg daily  GERD continue pantoprazole daily  COPD continue albuterol as needed continue montelukast 10mg daily continue spiriva and breo ellipta inhalers  hx of bipolar disorder continue lamotrigine 200mg daily and trazodone 100mg daily.  Follow up in 3 months for cpe

## 2024-05-13 DIAGNOSIS — D64.9 ANEMIA, UNSPECIFIED: ICD-10-CM

## 2024-05-13 DIAGNOSIS — E87.5 HYPERKALEMIA: ICD-10-CM

## 2024-05-13 LAB
25(OH)D3 SERPL-MCNC: 25.7 NG/ML
ALBUMIN SERPL ELPH-MCNC: 3.9 G/DL
ALP BLD-CCNC: 75 U/L
ALT SERPL-CCNC: 18 U/L
ANION GAP SERPL CALC-SCNC: 13 MMOL/L
AST SERPL-CCNC: 15 U/L
BASOPHILS # BLD AUTO: 0.14 K/UL
BASOPHILS NFR BLD AUTO: 1 %
BILIRUB SERPL-MCNC: <0.2 MG/DL
BUN SERPL-MCNC: 24 MG/DL
CALCIUM SERPL-MCNC: 9.2 MG/DL
CHLORIDE SERPL-SCNC: 99 MMOL/L
CO2 SERPL-SCNC: 24 MMOL/L
CREAT SERPL-MCNC: 1.79 MG/DL
EGFR: 33 ML/MIN/1.73M2
EOSINOPHIL # BLD AUTO: 0.35 K/UL
EOSINOPHIL NFR BLD AUTO: 2.5 %
ESTIMATED AVERAGE GLUCOSE: 131 MG/DL
GLUCOSE SERPL-MCNC: 173 MG/DL
HBA1C MFR BLD HPLC: 6.2 %
HCT VFR BLD CALC: 35.3 %
HGB BLD-MCNC: 10.9 G/DL
IMM GRANULOCYTES NFR BLD AUTO: 0.5 %
LYMPHOCYTES # BLD AUTO: 3.53 K/UL
LYMPHOCYTES NFR BLD AUTO: 25.6 %
MAN DIFF?: NORMAL
MCHC RBC-ENTMCNC: 28.2 PG
MCHC RBC-ENTMCNC: 30.9 GM/DL
MCV RBC AUTO: 91.5 FL
MONOCYTES # BLD AUTO: 1.26 K/UL
MONOCYTES NFR BLD AUTO: 9.1 %
NEUTROPHILS # BLD AUTO: 8.46 K/UL
NEUTROPHILS NFR BLD AUTO: 61.3 %
PLATELET # BLD AUTO: 381 K/UL
POTASSIUM SERPL-SCNC: 5.9 MMOL/L
PROT SERPL-MCNC: 6.8 G/DL
RBC # BLD: 3.86 M/UL
RBC # FLD: 14.2 %
SODIUM SERPL-SCNC: 136 MMOL/L
TSH SERPL-ACNC: 4.43 UIU/ML
VIT B12 SERPL-MCNC: 499 PG/ML
WBC # FLD AUTO: 13.81 K/UL

## 2024-05-15 RX ORDER — GLIPIZIDE 10 MG/1
10 TABLET, FILM COATED, EXTENDED RELEASE ORAL
Qty: 180 | Refills: 1 | Status: DISCONTINUED | COMMUNITY
Start: 2022-02-07 | End: 2024-05-15

## 2024-05-23 RX ORDER — PANTOPRAZOLE 40 MG/1
40 TABLET, DELAYED RELEASE ORAL
Qty: 90 | Refills: 1 | Status: ACTIVE | COMMUNITY
Start: 2023-01-18 | End: 1900-01-01

## 2024-05-30 ENCOUNTER — NON-APPOINTMENT (OUTPATIENT)
Age: 59
End: 2024-05-30

## 2024-08-06 ENCOUNTER — RESULT REVIEW (OUTPATIENT)
Age: 59
End: 2024-08-06

## 2024-08-07 ENCOUNTER — NON-APPOINTMENT (OUTPATIENT)
Age: 59
End: 2024-08-07

## 2024-08-08 ENCOUNTER — TRANSCRIPTION ENCOUNTER (OUTPATIENT)
Age: 59
End: 2024-08-08

## 2024-08-09 ENCOUNTER — APPOINTMENT (OUTPATIENT)
Dept: ORTHOPEDIC SURGERY | Facility: HOSPITAL | Age: 59
End: 2024-08-09

## 2024-08-09 ENCOUNTER — TRANSCRIPTION ENCOUNTER (OUTPATIENT)
Age: 59
End: 2024-08-09

## 2024-08-12 ENCOUNTER — TRANSCRIPTION ENCOUNTER (OUTPATIENT)
Age: 59
End: 2024-08-12

## 2024-08-16 PROBLEM — E11.9 TYPE 2 DIABETES MELLITUS WITHOUT COMPLICATIONS: Chronic | Status: ACTIVE | Noted: 2024-08-01

## 2024-08-16 PROBLEM — I10 ESSENTIAL (PRIMARY) HYPERTENSION: Chronic | Status: ACTIVE | Noted: 2024-08-01

## 2024-08-19 ENCOUNTER — NON-APPOINTMENT (OUTPATIENT)
Age: 59
End: 2024-08-19

## 2024-08-26 DIAGNOSIS — Z96.649 PRESENCE OF UNSPECIFIED ARTIFICIAL HIP JOINT: ICD-10-CM

## 2024-08-26 DIAGNOSIS — Z89.629: ICD-10-CM

## 2024-08-28 ENCOUNTER — APPOINTMENT (OUTPATIENT)
Dept: ORTHOPEDIC SURGERY | Facility: CLINIC | Age: 59
End: 2024-08-28
Payer: MEDICARE

## 2024-08-28 VITALS
SYSTOLIC BLOOD PRESSURE: 115 MMHG | BODY MASS INDEX: 35.17 KG/M2 | HEART RATE: 78 BPM | HEIGHT: 64 IN | DIASTOLIC BLOOD PRESSURE: 65 MMHG | WEIGHT: 206 LBS

## 2024-08-28 DIAGNOSIS — Z96.642 PRESENCE OF LEFT ARTIFICIAL HIP JOINT: ICD-10-CM

## 2024-08-28 PROCEDURE — 99024 POSTOP FOLLOW-UP VISIT: CPT

## 2024-08-28 PROCEDURE — 73502 X-RAY EXAM HIP UNI 2-3 VIEWS: CPT

## 2024-09-04 PROBLEM — Z96.642 HISTORY OF REVISION OF TOTAL REPLACEMENT OF LEFT HIP JOINT: Status: ACTIVE | Noted: 2024-09-04

## 2024-09-04 NOTE — HISTORY OF PRESENT ILLNESS
[___ Weeks Post Op] : [unfilled] weeks post op [de-identified] : Left revision total hip arthroplasty, acetabular and femoral components.DOS: 08/09/2024   [de-identified] : 59  presents to office 3 weeks  status post Left revision total hip arthroplasty, date of surgery 8/9/24. Currently in rehab facility. Ambulating with the use of a Walker but states she gets tired easily and spend majority of her time in a wheelchair. Receives lovenox injections for DVT ppx. Has been participating in PT in her rehab facility. Patient currently on antibiotics for history of urosepsis. Denies any recent injuries, falls, trauma, incisional issues, erythema, drainage, discharge, chest pain, shortness of breath, swelling, calf tenderness, numbness/tingling.  [de-identified] : Left lower extremity exam: Incisions well-healed without erythema drainage or discharge, hip full range of motion with 30 degrees of external rotation and 0 degrees of internal rotation, no swelling of the lower extremity, calf compressible, 5 out of 5 strength for plantarflexion dorsiflexion, sensation intact light touch over the foot, foot warm perfused brisk cap re [de-identified] : AP pelvis and 2 views of left hip taken in office today show no acute fracture dislocation.  There is a revision left total hip arthroplasty in appropriate alignment without evidence of loosening or hardware compromise. [de-identified] : 59-year-old female presents to the office 3 weeks status post left revision total hip arthroplasty, date of surgery 8/9/2024.  The patient is currently in a rehab facility.  I have stressed the importance of ambulating multiple times a day.  She should continue Lovenox for DVT prophylaxis.  She may continue her pain regimen per the rehab facility.  I recommend she be weightbearing as tolerated.  She should continue to participate in physical therapy, we discussed posterior hip precautions.  We also discussed proper wound care. Patient should continue antibiotics as directed for 6 weeks.  Patient should follow-up in 3 weeks for repeat evaluation and imaging.  All questions addressed, patient in agreement.

## 2024-09-04 NOTE — HISTORY OF PRESENT ILLNESS
[___ Weeks Post Op] : [unfilled] weeks post op [de-identified] : Left revision total hip arthroplasty, acetabular and femoral components.DOS: 08/09/2024   [de-identified] : 59  presents to office 3 weeks  status post Left revision total hip arthroplasty, date of surgery 8/9/24. Currently in rehab facility. Ambulating with the use of a Walker but states she gets tired easily and spend majority of her time in a wheelchair. Receives lovenox injections for DVT ppx. Has been participating in PT in her rehab facility. Patient currently on antibiotics for history of urosepsis. Denies any recent injuries, falls, trauma, incisional issues, erythema, drainage, discharge, chest pain, shortness of breath, swelling, calf tenderness, numbness/tingling.  [de-identified] : Left lower extremity exam: Incisions well-healed without erythema drainage or discharge, hip full range of motion with 30 degrees of external rotation and 0 degrees of internal rotation, no swelling of the lower extremity, calf compressible, 5 out of 5 strength for plantarflexion dorsiflexion, sensation intact light touch over the foot, foot warm perfused brisk cap re [de-identified] : AP pelvis and 2 views of left hip taken in office today show no acute fracture dislocation.  There is a revision left total hip arthroplasty in appropriate alignment without evidence of loosening or hardware compromise. [de-identified] : 59-year-old female presents to the office 3 weeks status post left revision total hip arthroplasty, date of surgery 8/9/2024.  The patient is currently in a rehab facility.  I have stressed the importance of ambulating multiple times a day.  She should continue Lovenox for DVT prophylaxis.  She may continue her pain regimen per the rehab facility.  I recommend she be weightbearing as tolerated.  She should continue to participate in physical therapy, we discussed posterior hip precautions.  We also discussed proper wound care. Patient should continue antibiotics as directed for 6 weeks.  Patient should follow-up in 3 weeks for repeat evaluation and imaging.  All questions addressed, patient in agreement.

## 2024-09-20 ENCOUNTER — NON-APPOINTMENT (OUTPATIENT)
Age: 59
End: 2024-09-20

## 2024-09-25 ENCOUNTER — APPOINTMENT (OUTPATIENT)
Dept: ORTHOPEDIC SURGERY | Facility: CLINIC | Age: 59
End: 2024-09-25
Payer: MEDICARE

## 2024-09-25 DIAGNOSIS — Z96.642 PRESENCE OF LEFT ARTIFICIAL HIP JOINT: ICD-10-CM

## 2024-09-25 PROCEDURE — 73502 X-RAY EXAM HIP UNI 2-3 VIEWS: CPT

## 2024-09-25 PROCEDURE — 99024 POSTOP FOLLOW-UP VISIT: CPT

## 2024-09-26 NOTE — HISTORY OF PRESENT ILLNESS
[Chills] : no chills [Constipation] : no constipation [Diarrhea] : no diarrhea [Fever] : no fever [Nausea] : no nausea [Vomiting] : no vomiting [de-identified] : Left revision total hip arthroplasty, acetabular and femoral components.DOS: 08/09/2024. [de-identified] : 59 presents to office about 6 weeks status post Left revision total hip arthroplasty, date of surgery 8/9/24. Still residing in a  rehab facility. Ambulating with the use of a Walker, states she has been able to walker further distances.  Has completed lovenox injections for DVT ppx. Has been participating in PT in her rehab facility. Has finished her course of antibioitics for urosepsis but remains of nitrofurantoin for possible UTI. Denies any recent injuries, falls, trauma, incisional issues, erythema, drainage, discharge, chest pain, shortness of breath, swelling, calf tenderness, numbness/tingling. [de-identified] : Left lower extremity exam: Incisions well-healed without erythema drainage or discharge, hip full range of motion with 30 degrees of external rotation and 0 degrees of internal rotation, no swelling of the lower extremity, calf compressible, 5 out of 5 strength for plantarflexion dorsiflexion, sensation intact light touch over the foot, foot warm perfused brisk cap refill [de-identified] : AP pelvis and 2 views of left hip taken in office today show no acute fracture dislocation. There is a revision left total hip arthroplasty in appropriate alignment without evidence of loosening or hardware compromise. [de-identified] : 59-year-old female presents to the office about 6 weeks status post left revision total hip arthroplasty, date of surgery 8/9/2024. The patient is currently in a rehab facility. I have again stressed the importance of ambulating multiple times a day. She may continue her pain regimen per the rehab facility. I recommend she be weightbearing as tolerated. She should continue to participate in physical therapy, we discussed posterior hip precautions. Patient should follow-up in 6 weeks for repeat evaluation and imaging or sooner if any issues arise.  All questions addressed, patient in agreement.

## 2024-09-26 NOTE — HISTORY OF PRESENT ILLNESS
[Chills] : no chills [Constipation] : no constipation [Diarrhea] : no diarrhea [Fever] : no fever [Nausea] : no nausea [Vomiting] : no vomiting [de-identified] : Left revision total hip arthroplasty, acetabular and femoral components.DOS: 08/09/2024. [de-identified] : 59 presents to office about 6 weeks status post Left revision total hip arthroplasty, date of surgery 8/9/24. Still residing in a  rehab facility. Ambulating with the use of a Walker, states she has been able to walker further distances.  Has completed lovenox injections for DVT ppx. Has been participating in PT in her rehab facility. Has finished her course of antibioitics for urosepsis but remains of nitrofurantoin for possible UTI. Denies any recent injuries, falls, trauma, incisional issues, erythema, drainage, discharge, chest pain, shortness of breath, swelling, calf tenderness, numbness/tingling. [de-identified] : Left lower extremity exam: Incisions well-healed without erythema drainage or discharge, hip full range of motion with 30 degrees of external rotation and 0 degrees of internal rotation, no swelling of the lower extremity, calf compressible, 5 out of 5 strength for plantarflexion dorsiflexion, sensation intact light touch over the foot, foot warm perfused brisk cap refill [de-identified] : AP pelvis and 2 views of left hip taken in office today show no acute fracture dislocation. There is a revision left total hip arthroplasty in appropriate alignment without evidence of loosening or hardware compromise. [de-identified] : 59-year-old female presents to the office about 6 weeks status post left revision total hip arthroplasty, date of surgery 8/9/2024. The patient is currently in a rehab facility. I have again stressed the importance of ambulating multiple times a day. She may continue her pain regimen per the rehab facility. I recommend she be weightbearing as tolerated. She should continue to participate in physical therapy, we discussed posterior hip precautions. Patient should follow-up in 6 weeks for repeat evaluation and imaging or sooner if any issues arise.  All questions addressed, patient in agreement.

## 2024-11-06 ENCOUNTER — APPOINTMENT (OUTPATIENT)
Dept: ORTHOPEDIC SURGERY | Facility: CLINIC | Age: 59
End: 2024-11-06
Payer: MEDICARE

## 2024-11-06 DIAGNOSIS — Z96.642 PRESENCE OF LEFT ARTIFICIAL HIP JOINT: ICD-10-CM

## 2024-11-06 PROCEDURE — 99024 POSTOP FOLLOW-UP VISIT: CPT

## 2024-11-06 PROCEDURE — 73502 X-RAY EXAM HIP UNI 2-3 VIEWS: CPT

## 2024-11-26 DIAGNOSIS — M25.562 PAIN IN LEFT KNEE: ICD-10-CM

## 2024-11-27 ENCOUNTER — APPOINTMENT (OUTPATIENT)
Dept: ORTHOPEDIC SURGERY | Facility: CLINIC | Age: 59
End: 2024-11-27
Payer: MEDICARE

## 2024-11-27 VITALS
WEIGHT: 206 LBS | BODY MASS INDEX: 35.17 KG/M2 | HEIGHT: 64 IN | HEART RATE: 86 BPM | DIASTOLIC BLOOD PRESSURE: 86 MMHG | SYSTOLIC BLOOD PRESSURE: 125 MMHG

## 2024-11-27 DIAGNOSIS — M17.12 UNILATERAL PRIMARY OSTEOARTHRITIS, LEFT KNEE: ICD-10-CM

## 2024-11-27 PROCEDURE — 99214 OFFICE O/P EST MOD 30 MIN: CPT | Mod: 25

## 2024-11-27 PROCEDURE — 20610 DRAIN/INJ JOINT/BURSA W/O US: CPT | Mod: LT

## 2024-11-27 PROCEDURE — 73564 X-RAY EXAM KNEE 4 OR MORE: CPT | Mod: LT

## 2024-12-17 ENCOUNTER — APPOINTMENT (OUTPATIENT)
Dept: INTERNAL MEDICINE | Facility: CLINIC | Age: 59
End: 2024-12-17
Payer: MEDICARE

## 2024-12-17 VITALS
HEART RATE: 111 BPM | HEIGHT: 64 IN | DIASTOLIC BLOOD PRESSURE: 91 MMHG | WEIGHT: 206 LBS | BODY MASS INDEX: 35.17 KG/M2 | SYSTOLIC BLOOD PRESSURE: 123 MMHG

## 2024-12-17 DIAGNOSIS — M16.12 UNILATERAL PRIMARY OSTEOARTHRITIS, LEFT HIP: ICD-10-CM

## 2024-12-17 DIAGNOSIS — J44.9 CHRONIC OBSTRUCTIVE PULMONARY DISEASE, UNSPECIFIED: ICD-10-CM

## 2024-12-17 DIAGNOSIS — I10 ESSENTIAL (PRIMARY) HYPERTENSION: ICD-10-CM

## 2024-12-17 DIAGNOSIS — N18.9 CHRONIC KIDNEY DISEASE, UNSPECIFIED: ICD-10-CM

## 2024-12-17 DIAGNOSIS — E78.5 HYPERLIPIDEMIA, UNSPECIFIED: ICD-10-CM

## 2024-12-17 DIAGNOSIS — E11.9 TYPE 2 DIABETES MELLITUS W/OUT COMPLICATIONS: ICD-10-CM

## 2024-12-17 DIAGNOSIS — R49.0 DYSPHONIA: ICD-10-CM

## 2024-12-17 PROCEDURE — 36415 COLL VENOUS BLD VENIPUNCTURE: CPT

## 2024-12-17 PROCEDURE — G2211 COMPLEX E/M VISIT ADD ON: CPT

## 2024-12-17 PROCEDURE — 99214 OFFICE O/P EST MOD 30 MIN: CPT

## 2024-12-17 RX ORDER — SODIUM BICARBONATE 650 MG/1
650 TABLET ORAL TWICE DAILY
Qty: 180 | Refills: 1 | Status: ACTIVE | COMMUNITY
Start: 2024-12-17 | End: 1900-01-01

## 2024-12-17 RX ORDER — FLUTICASONE PROPIONATE 50 UG/1
50 SPRAY, METERED NASAL
Qty: 1 | Refills: 0 | Status: ACTIVE | COMMUNITY
Start: 2024-12-17 | End: 1900-01-01

## 2024-12-17 RX ORDER — METOPROLOL SUCCINATE 100 MG/1
100 TABLET, EXTENDED RELEASE ORAL
Qty: 90 | Refills: 1 | Status: ACTIVE | COMMUNITY
Start: 2024-12-17 | End: 1900-01-01

## 2024-12-17 RX ORDER — GLIPIZIDE 10 MG/1
10 TABLET ORAL
Qty: 180 | Refills: 1 | Status: ACTIVE | COMMUNITY
Start: 2024-12-17 | End: 1900-01-01

## 2024-12-17 RX ORDER — UMECLIDINIUM 62.5 UG/1
62.5 AEROSOL, POWDER ORAL
Refills: 0 | Status: ACTIVE | COMMUNITY
Start: 2024-12-17

## 2024-12-17 RX ORDER — FUROSEMIDE 20 MG/1
20 TABLET ORAL DAILY
Qty: 90 | Refills: 1 | Status: ACTIVE | COMMUNITY
Start: 2024-12-17 | End: 1900-01-01

## 2024-12-19 LAB
ALBUMIN SERPL ELPH-MCNC: 4.2 G/DL
ALP BLD-CCNC: 89 U/L
ALT SERPL-CCNC: 7 U/L
ANION GAP SERPL CALC-SCNC: 20 MMOL/L
AST SERPL-CCNC: 11 U/L
BASOPHILS # BLD AUTO: 0.13 K/UL
BASOPHILS NFR BLD AUTO: 1 %
BILIRUB SERPL-MCNC: 0.2 MG/DL
BUN SERPL-MCNC: 36 MG/DL
CALCIUM SERPL-MCNC: 8.8 MG/DL
CHLORIDE SERPL-SCNC: 97 MMOL/L
CO2 SERPL-SCNC: 22 MMOL/L
CREAT SERPL-MCNC: 2.37 MG/DL
EGFR: 23 ML/MIN/1.73M2
EOSINOPHIL # BLD AUTO: 0.22 K/UL
EOSINOPHIL NFR BLD AUTO: 1.7 %
ESTIMATED AVERAGE GLUCOSE: 146 MG/DL
GLUCOSE SERPL-MCNC: 224 MG/DL
HBA1C MFR BLD HPLC: 6.7 %
HCT VFR BLD CALC: 40.9 %
HGB BLD-MCNC: 12.6 G/DL
IMM GRANULOCYTES NFR BLD AUTO: 0.5 %
LYMPHOCYTES # BLD AUTO: 2.4 K/UL
LYMPHOCYTES NFR BLD AUTO: 18.5 %
MAN DIFF?: NORMAL
MCHC RBC-ENTMCNC: 25.1 PG
MCHC RBC-ENTMCNC: 30.8 G/DL
MCV RBC AUTO: 81.5 FL
MONOCYTES # BLD AUTO: 1.44 K/UL
MONOCYTES NFR BLD AUTO: 11.1 %
NEUTROPHILS # BLD AUTO: 8.71 K/UL
NEUTROPHILS NFR BLD AUTO: 67.2 %
PLATELET # BLD AUTO: 230 K/UL
POTASSIUM SERPL-SCNC: 3.6 MMOL/L
PROT SERPL-MCNC: 7.2 G/DL
RBC # BLD: 5.02 M/UL
RBC # FLD: 17.7 %
SODIUM SERPL-SCNC: 139 MMOL/L
WBC # FLD AUTO: 12.96 K/UL

## 2025-01-13 ENCOUNTER — APPOINTMENT (OUTPATIENT)
Dept: OTOLARYNGOLOGY | Facility: CLINIC | Age: 60
End: 2025-01-13

## 2025-02-07 ENCOUNTER — NON-APPOINTMENT (OUTPATIENT)
Age: 60
End: 2025-02-07

## 2025-03-18 ENCOUNTER — APPOINTMENT (OUTPATIENT)
Dept: ORTHOPEDIC SURGERY | Facility: CLINIC | Age: 60
End: 2025-03-18
Payer: MEDICARE

## 2025-03-18 VITALS
DIASTOLIC BLOOD PRESSURE: 83 MMHG | WEIGHT: 206 LBS | HEART RATE: 106 BPM | HEIGHT: 64 IN | SYSTOLIC BLOOD PRESSURE: 118 MMHG | BODY MASS INDEX: 35.17 KG/M2

## 2025-03-18 VITALS
DIASTOLIC BLOOD PRESSURE: 93 MMHG | WEIGHT: 206 LBS | BODY MASS INDEX: 35.17 KG/M2 | SYSTOLIC BLOOD PRESSURE: 153 MMHG | HEIGHT: 64 IN | HEART RATE: 71 BPM

## 2025-03-18 DIAGNOSIS — M17.12 UNILATERAL PRIMARY OSTEOARTHRITIS, LEFT KNEE: ICD-10-CM

## 2025-03-18 PROCEDURE — 99214 OFFICE O/P EST MOD 30 MIN: CPT | Mod: 25

## 2025-03-18 PROCEDURE — 20610 DRAIN/INJ JOINT/BURSA W/O US: CPT | Mod: LT

## 2025-04-22 ENCOUNTER — APPOINTMENT (OUTPATIENT)
Dept: INTERNAL MEDICINE | Facility: CLINIC | Age: 60
End: 2025-04-22
Payer: MEDICARE

## 2025-04-22 VITALS
BODY MASS INDEX: 36.54 KG/M2 | HEIGHT: 64 IN | DIASTOLIC BLOOD PRESSURE: 80 MMHG | HEART RATE: 91 BPM | OXYGEN SATURATION: 96 % | WEIGHT: 214 LBS | SYSTOLIC BLOOD PRESSURE: 135 MMHG

## 2025-04-22 DIAGNOSIS — I10 ESSENTIAL (PRIMARY) HYPERTENSION: ICD-10-CM

## 2025-04-22 DIAGNOSIS — M16.12 UNILATERAL PRIMARY OSTEOARTHRITIS, LEFT HIP: ICD-10-CM

## 2025-04-22 DIAGNOSIS — E11.9 TYPE 2 DIABETES MELLITUS W/OUT COMPLICATIONS: ICD-10-CM

## 2025-04-22 DIAGNOSIS — E78.5 HYPERLIPIDEMIA, UNSPECIFIED: ICD-10-CM

## 2025-04-22 DIAGNOSIS — N18.9 CHRONIC KIDNEY DISEASE, UNSPECIFIED: ICD-10-CM

## 2025-04-22 DIAGNOSIS — J44.9 CHRONIC OBSTRUCTIVE PULMONARY DISEASE, UNSPECIFIED: ICD-10-CM

## 2025-04-22 DIAGNOSIS — M17.12 UNILATERAL PRIMARY OSTEOARTHRITIS, LEFT KNEE: ICD-10-CM

## 2025-04-22 PROCEDURE — 99214 OFFICE O/P EST MOD 30 MIN: CPT

## 2025-04-22 PROCEDURE — 36415 COLL VENOUS BLD VENIPUNCTURE: CPT

## 2025-04-22 PROCEDURE — G2211 COMPLEX E/M VISIT ADD ON: CPT

## 2025-04-24 LAB
ALBUMIN SERPL ELPH-MCNC: 4.2 G/DL
ALP BLD-CCNC: 90 U/L
ALT SERPL-CCNC: 9 U/L
ANION GAP SERPL CALC-SCNC: 18 MMOL/L
AST SERPL-CCNC: 10 U/L
BASOPHILS # BLD AUTO: 0.16 K/UL
BASOPHILS NFR BLD AUTO: 1.2 %
BILIRUB SERPL-MCNC: 0.3 MG/DL
BUN SERPL-MCNC: 38 MG/DL
CALCIUM SERPL-MCNC: 9.3 MG/DL
CHLORIDE SERPL-SCNC: 95 MMOL/L
CHOLEST SERPL-MCNC: 172 MG/DL
CO2 SERPL-SCNC: 24 MMOL/L
CREAT SERPL-MCNC: 2.23 MG/DL
EGFRCR SERPLBLD CKD-EPI 2021: 25 ML/MIN/1.73M2
EOSINOPHIL # BLD AUTO: 0.29 K/UL
EOSINOPHIL NFR BLD AUTO: 2.1 %
ESTIMATED AVERAGE GLUCOSE: 186 MG/DL
GLUCOSE SERPL-MCNC: 212 MG/DL
HBA1C MFR BLD HPLC: 8.1 %
HCT VFR BLD CALC: 41 %
HDLC SERPL-MCNC: 43 MG/DL
HGB BLD-MCNC: 12.8 G/DL
IMM GRANULOCYTES NFR BLD AUTO: 0.4 %
LDLC SERPL-MCNC: 90 MG/DL
LYMPHOCYTES # BLD AUTO: 3.21 K/UL
LYMPHOCYTES NFR BLD AUTO: 23.2 %
MAN DIFF?: NORMAL
MCHC RBC-ENTMCNC: 29.5 PG
MCHC RBC-ENTMCNC: 31.2 G/DL
MCV RBC AUTO: 94.5 FL
MONOCYTES # BLD AUTO: 1.38 K/UL
MONOCYTES NFR BLD AUTO: 10 %
NEUTROPHILS # BLD AUTO: 8.72 K/UL
NEUTROPHILS NFR BLD AUTO: 63.1 %
NONHDLC SERPL-MCNC: 129 MG/DL
PLATELET # BLD AUTO: 272 K/UL
POTASSIUM SERPL-SCNC: 4.3 MMOL/L
PROT SERPL-MCNC: 7.2 G/DL
RBC # BLD: 4.34 M/UL
RBC # FLD: 15.4 %
SODIUM SERPL-SCNC: 138 MMOL/L
TRIGL SERPL-MCNC: 236 MG/DL
TSH SERPL-ACNC: 2.41 UIU/ML
WBC # FLD AUTO: 13.81 K/UL

## 2025-06-18 ENCOUNTER — APPOINTMENT (OUTPATIENT)
Dept: ORTHOPEDIC SURGERY | Facility: CLINIC | Age: 60
End: 2025-06-18
Payer: MEDICARE

## 2025-06-18 VITALS
HEART RATE: 132 BPM | HEIGHT: 64 IN | WEIGHT: 214 LBS | BODY MASS INDEX: 36.54 KG/M2 | SYSTOLIC BLOOD PRESSURE: 140 MMHG | DIASTOLIC BLOOD PRESSURE: 92 MMHG

## 2025-06-18 PROCEDURE — 99214 OFFICE O/P EST MOD 30 MIN: CPT | Mod: 25

## 2025-06-18 PROCEDURE — 20610 DRAIN/INJ JOINT/BURSA W/O US: CPT | Mod: LT

## 2025-06-20 ENCOUNTER — OFFICE (OUTPATIENT)
Dept: URBAN - METROPOLITAN AREA CLINIC 104 | Facility: CLINIC | Age: 60
Setting detail: OPHTHALMOLOGY
End: 2025-06-20
Payer: MEDICARE

## 2025-06-20 DIAGNOSIS — H26.491: ICD-10-CM

## 2025-06-20 DIAGNOSIS — H52.4: ICD-10-CM

## 2025-06-20 DIAGNOSIS — H43.813: ICD-10-CM

## 2025-06-20 PROCEDURE — 92250 FUNDUS PHOTOGRAPHY W/I&R: CPT | Performed by: OPTOMETRIST

## 2025-06-20 PROCEDURE — 92015 DETERMINE REFRACTIVE STATE: CPT | Performed by: OPTOMETRIST

## 2025-06-20 PROCEDURE — 92014 COMPRE OPH EXAM EST PT 1/>: CPT | Performed by: OPTOMETRIST

## 2025-06-20 ASSESSMENT — REFRACTION_MANIFEST
OS_SPHERE: +1.50
OD_AXIS: 010
OD_SPHERE: +0.50
OD_ADD: +2.50
OS_ADD: +2.50
OD_CYLINDER: -1.75
OD_VA1: 20/40
OS_VA1: 20/50
OS_AXIS: 150
OS_CYLINDER: -3.25

## 2025-06-20 ASSESSMENT — REFRACTION_AUTOREFRACTION
OD_AXIS: 006
OD_SPHERE: +0.75
OS_AXIS: 161
OS_CYLINDER: -3.00
OS_SPHERE: +1.50
OD_CYLINDER: -1.75

## 2025-06-20 ASSESSMENT — CONFRONTATIONAL VISUAL FIELD TEST (CVF)
OS_FINDINGS: FULL
OD_FINDINGS: FULL

## 2025-06-20 ASSESSMENT — KERATOMETRY
OD_K1POWER_DIOPTERS: 44.94
OD_AXISANGLE_DEGREES: 095
OS_AXISANGLE_DEGREES: 075
OS_K1POWER_DIOPTERS: 44.88
OS_K2POWER_DIOPTERS: 49.06
OD_K2POWER_DIOPTERS: 47.40

## 2025-06-20 ASSESSMENT — VISUAL ACUITY
OD_BCVA: 20/40
OS_BCVA: 20/50

## 2025-06-26 RX ORDER — HYALURONATE SODIUM 20 MG/2 ML
20 SYRINGE (ML) INTRAARTICULAR
Qty: 3 | Refills: 0 | Status: ACTIVE | OUTPATIENT
Start: 2025-06-18

## 2025-07-15 RX ORDER — HYLAN G-F 20 16MG/2ML
16 SYRINGE (ML) INTRAARTICULAR
Qty: 3 | Refills: 0 | Status: DISCONTINUED | COMMUNITY
Start: 2025-07-15 | End: 2025-07-15

## 2025-07-18 RX ORDER — HYALURONATE SODIUM 30 MG/2 ML
30 SYRINGE (ML) INTRAARTICULAR
Qty: 6 | Refills: 0 | Status: DISCONTINUED | OUTPATIENT
Start: 2025-07-14 | End: 2025-07-18

## 2025-07-18 RX ORDER — HYALURONATE SODIUM 30 MG/2 ML
30 SYRINGE (ML) INTRAARTICULAR
Qty: 3 | Refills: 0 | Status: COMPLETED | OUTPATIENT
Start: 2025-07-14 | End: 2025-07-18

## 2025-07-21 RX ORDER — HYLAN G-F 20 16MG/2ML
16 SYRINGE (ML) INTRAARTICULAR
Qty: 1 | Refills: 0 | Status: ACTIVE | OUTPATIENT
Start: 2025-07-17

## 2025-07-22 ENCOUNTER — APPOINTMENT (OUTPATIENT)
Dept: INTERNAL MEDICINE | Facility: CLINIC | Age: 60
End: 2025-07-22
Payer: MEDICARE

## 2025-07-22 VITALS
WEIGHT: 213 LBS | DIASTOLIC BLOOD PRESSURE: 91 MMHG | BODY MASS INDEX: 36.56 KG/M2 | SYSTOLIC BLOOD PRESSURE: 116 MMHG | HEART RATE: 91 BPM

## 2025-07-22 DIAGNOSIS — I10 ESSENTIAL (PRIMARY) HYPERTENSION: ICD-10-CM

## 2025-07-22 DIAGNOSIS — R30.0 DYSURIA: ICD-10-CM

## 2025-07-22 DIAGNOSIS — E11.9 TYPE 2 DIABETES MELLITUS W/OUT COMPLICATIONS: ICD-10-CM

## 2025-07-22 DIAGNOSIS — E78.5 HYPERLIPIDEMIA, UNSPECIFIED: ICD-10-CM

## 2025-07-22 DIAGNOSIS — R21 RASH AND OTHER NONSPECIFIC SKIN ERUPTION: ICD-10-CM

## 2025-07-22 PROCEDURE — 99214 OFFICE O/P EST MOD 30 MIN: CPT

## 2025-07-22 PROCEDURE — G2211 COMPLEX E/M VISIT ADD ON: CPT

## 2025-07-22 PROCEDURE — 36415 COLL VENOUS BLD VENIPUNCTURE: CPT

## 2025-07-28 ENCOUNTER — NON-APPOINTMENT (OUTPATIENT)
Age: 60
End: 2025-07-28

## 2025-07-28 LAB
ALBUMIN SERPL ELPH-MCNC: 4.1 G/DL
ALP BLD-CCNC: 82 U/L
ALT SERPL-CCNC: 16 U/L
ANION GAP SERPL CALC-SCNC: 23 MMOL/L
AST SERPL-CCNC: 14 U/L
BASOPHILS # BLD AUTO: 0.1 K/UL
BASOPHILS NFR BLD AUTO: 0.8 %
BILIRUB SERPL-MCNC: 0.2 MG/DL
BUN SERPL-MCNC: 38 MG/DL
CALCIUM SERPL-MCNC: 8.9 MG/DL
CHLORIDE SERPL-SCNC: 100 MMOL/L
CHOLEST SERPL-MCNC: 153 MG/DL
CO2 SERPL-SCNC: 16 MMOL/L
CREAT SERPL-MCNC: 2.15 MG/DL
EGFRCR SERPLBLD CKD-EPI 2021: 26 ML/MIN/1.73M2
EOSINOPHIL # BLD AUTO: 0.2 K/UL
EOSINOPHIL NFR BLD AUTO: 1.6 %
ESTIMATED AVERAGE GLUCOSE: 177 MG/DL
GLUCOSE SERPL-MCNC: 195 MG/DL
HBA1C MFR BLD HPLC: 7.8 %
HCT VFR BLD CALC: 39.5 %
HDLC SERPL-MCNC: 52 MG/DL
HGB BLD-MCNC: 12.6 G/DL
IMM GRANULOCYTES NFR BLD AUTO: 0.3 %
LDLC SERPL-MCNC: 77 MG/DL
LYMPHOCYTES # BLD AUTO: 2.7 K/UL
LYMPHOCYTES NFR BLD AUTO: 22.1 %
MAN DIFF?: NORMAL
MCHC RBC-ENTMCNC: 30.5 PG
MCHC RBC-ENTMCNC: 31.9 G/DL
MCV RBC AUTO: 95.6 FL
MONOCYTES # BLD AUTO: 1.08 K/UL
MONOCYTES NFR BLD AUTO: 8.9 %
NEUTROPHILS # BLD AUTO: 8.08 K/UL
NEUTROPHILS NFR BLD AUTO: 66.3 %
NONHDLC SERPL-MCNC: 101 MG/DL
PLATELET # BLD AUTO: 201 K/UL
POTASSIUM SERPL-SCNC: 3.6 MMOL/L
PROT SERPL-MCNC: 6.6 G/DL
RBC # BLD: 4.13 M/UL
RBC # FLD: 15.4 %
SODIUM SERPL-SCNC: 139 MMOL/L
TRIGL SERPL-MCNC: 143 MG/DL
WBC # FLD AUTO: 12.2 K/UL

## 2025-08-05 ENCOUNTER — APPOINTMENT (OUTPATIENT)
Dept: ORTHOPEDIC SURGERY | Facility: CLINIC | Age: 60
End: 2025-08-05
Payer: MEDICARE

## 2025-08-05 VITALS
WEIGHT: 213 LBS | HEART RATE: 97 BPM | SYSTOLIC BLOOD PRESSURE: 127 MMHG | BODY MASS INDEX: 36.37 KG/M2 | HEIGHT: 64 IN | DIASTOLIC BLOOD PRESSURE: 80 MMHG

## 2025-08-05 PROCEDURE — 99214 OFFICE O/P EST MOD 30 MIN: CPT | Mod: 25

## 2025-08-05 PROCEDURE — 20610 DRAIN/INJ JOINT/BURSA W/O US: CPT | Mod: LT

## 2025-08-09 ENCOUNTER — NON-APPOINTMENT (OUTPATIENT)
Age: 60
End: 2025-08-09

## 2025-08-18 ENCOUNTER — APPOINTMENT (OUTPATIENT)
Dept: ORTHOPEDIC SURGERY | Facility: CLINIC | Age: 60
End: 2025-08-18
Payer: MEDICARE

## 2025-08-18 DIAGNOSIS — M17.12 UNILATERAL PRIMARY OSTEOARTHRITIS, LEFT KNEE: ICD-10-CM

## 2025-08-18 PROCEDURE — 20610 DRAIN/INJ JOINT/BURSA W/O US: CPT | Mod: LT

## 2025-09-10 ENCOUNTER — APPOINTMENT (OUTPATIENT)
Dept: ORTHOPEDIC SURGERY | Facility: CLINIC | Age: 60
End: 2025-09-10
Payer: MEDICARE

## 2025-09-10 DIAGNOSIS — M17.12 UNILATERAL PRIMARY OSTEOARTHRITIS, LEFT KNEE: ICD-10-CM

## 2025-09-10 PROCEDURE — 20610 DRAIN/INJ JOINT/BURSA W/O US: CPT | Mod: LT
